# Patient Record
Sex: FEMALE | Race: OTHER | Employment: OTHER | URBAN - METROPOLITAN AREA
[De-identification: names, ages, dates, MRNs, and addresses within clinical notes are randomized per-mention and may not be internally consistent; named-entity substitution may affect disease eponyms.]

---

## 2017-03-29 ENCOUNTER — HOSPITAL ENCOUNTER (OUTPATIENT)
Dept: PREADMISSION TESTING | Age: 67
Discharge: HOME | End: 2017-03-29
Payer: SUBSIDIZED

## 2017-03-29 VITALS
WEIGHT: 206 LBS | SYSTOLIC BLOOD PRESSURE: 124 MMHG | HEART RATE: 74 BPM | HEIGHT: 64 IN | BODY MASS INDEX: 35.17 KG/M2 | DIASTOLIC BLOOD PRESSURE: 76 MMHG | TEMPERATURE: 97.4 F

## 2017-03-29 LAB
ABO + RH BLD: NORMAL
ANION GAP BLD CALC-SCNC: 9 MMOL/L (ref 5–15)
APPEARANCE UR: CLEAR
BACTERIA URNS QL MICRO: NEGATIVE /HPF
BILIRUB UR QL: NEGATIVE
BLOOD GROUP ANTIBODIES SERPL: NORMAL
BUN SERPL-MCNC: 15 MG/DL (ref 6–20)
BUN/CREAT SERPL: 14 (ref 12–20)
CALCIUM SERPL-MCNC: 9.4 MG/DL (ref 8.5–10.1)
CHLORIDE SERPL-SCNC: 104 MMOL/L (ref 97–108)
CO2 SERPL-SCNC: 29 MMOL/L (ref 21–32)
COLOR UR: NORMAL
CREAT SERPL-MCNC: 1.08 MG/DL (ref 0.55–1.02)
EPITH CASTS URNS QL MICRO: NORMAL /LPF
ERYTHROCYTE [DISTWIDTH] IN BLOOD BY AUTOMATED COUNT: 15.6 % (ref 11.5–14.5)
EST. AVERAGE GLUCOSE BLD GHB EST-MCNC: 169 MG/DL
GLUCOSE SERPL-MCNC: 126 MG/DL (ref 65–100)
GLUCOSE UR STRIP.AUTO-MCNC: NEGATIVE MG/DL
HBA1C MFR BLD: 7.5 % (ref 4.2–6.3)
HCT VFR BLD AUTO: 32.4 % (ref 35–47)
HGB BLD-MCNC: 10.3 G/DL (ref 11.5–16)
HGB UR QL STRIP: NEGATIVE
HYALINE CASTS URNS QL MICRO: NORMAL /LPF (ref 0–5)
INR PPP: 0.9 (ref 0.9–1.1)
KETONES UR QL STRIP.AUTO: NEGATIVE MG/DL
LEUKOCYTE ESTERASE UR QL STRIP.AUTO: NEGATIVE
MCH RBC QN AUTO: 26.8 PG (ref 26–34)
MCHC RBC AUTO-ENTMCNC: 31.8 G/DL (ref 30–36.5)
MCV RBC AUTO: 84.2 FL (ref 80–99)
NITRITE UR QL STRIP.AUTO: NEGATIVE
PH UR STRIP: 6 [PH] (ref 5–8)
PLATELET # BLD AUTO: 334 K/UL (ref 150–400)
POTASSIUM SERPL-SCNC: 4.5 MMOL/L (ref 3.5–5.1)
PROT UR STRIP-MCNC: NEGATIVE MG/DL
PROTHROMBIN TIME: 9.5 SEC (ref 9–11.1)
RBC # BLD AUTO: 3.85 M/UL (ref 3.8–5.2)
RBC #/AREA URNS HPF: NORMAL /HPF (ref 0–5)
SODIUM SERPL-SCNC: 142 MMOL/L (ref 136–145)
SP GR UR REFRACTOMETRY: 1.02 (ref 1–1.03)
SPECIMEN EXP DATE BLD: NORMAL
UA: UC IF INDICATED,UAUC: NORMAL
UROBILINOGEN UR QL STRIP.AUTO: 0.2 EU/DL (ref 0.2–1)
WBC # BLD AUTO: 6.1 K/UL (ref 3.6–11)
WBC URNS QL MICRO: NORMAL /HPF (ref 0–4)

## 2017-03-29 PROCEDURE — 81001 URINALYSIS AUTO W/SCOPE: CPT | Performed by: ORTHOPAEDIC SURGERY

## 2017-03-29 PROCEDURE — 36415 COLL VENOUS BLD VENIPUNCTURE: CPT | Performed by: ORTHOPAEDIC SURGERY

## 2017-03-29 PROCEDURE — 85610 PROTHROMBIN TIME: CPT | Performed by: ORTHOPAEDIC SURGERY

## 2017-03-29 PROCEDURE — 93005 ELECTROCARDIOGRAM TRACING: CPT

## 2017-03-29 PROCEDURE — 85027 COMPLETE CBC AUTOMATED: CPT | Performed by: ORTHOPAEDIC SURGERY

## 2017-03-29 PROCEDURE — 83036 HEMOGLOBIN GLYCOSYLATED A1C: CPT | Performed by: ORTHOPAEDIC SURGERY

## 2017-03-29 PROCEDURE — 80048 BASIC METABOLIC PNL TOTAL CA: CPT | Performed by: ORTHOPAEDIC SURGERY

## 2017-03-29 PROCEDURE — 86900 BLOOD TYPING SEROLOGIC ABO: CPT | Performed by: ORTHOPAEDIC SURGERY

## 2017-03-29 RX ORDER — PREGABALIN 75 MG/1
75 CAPSULE ORAL ONCE
Status: CANCELLED | OUTPATIENT
Start: 2017-04-03 | End: 2017-04-03

## 2017-03-29 RX ORDER — DEXAMETHASONE SODIUM PHOSPHATE 100 MG/10ML
10 INJECTION INTRAMUSCULAR; INTRAVENOUS ONCE
Status: CANCELLED | OUTPATIENT
Start: 2017-04-03 | End: 2017-04-03

## 2017-03-29 RX ORDER — CEFAZOLIN SODIUM IN 0.9 % NACL 2 G/50 ML
2 INTRAVENOUS SOLUTION, PIGGYBACK (ML) INTRAVENOUS ONCE
Status: CANCELLED | OUTPATIENT
Start: 2017-04-03 | End: 2017-04-03

## 2017-03-29 RX ORDER — CELECOXIB 200 MG/1
200 CAPSULE ORAL ONCE
Status: CANCELLED | OUTPATIENT
Start: 2017-04-03 | End: 2017-04-03

## 2017-03-29 RX ORDER — LOVASTATIN 40 MG/1
40 TABLET ORAL DAILY
COMMUNITY

## 2017-03-29 RX ORDER — ACETAMINOPHEN 500 MG
1000 TABLET ORAL ONCE
Status: CANCELLED | OUTPATIENT
Start: 2017-04-03 | End: 2017-04-03

## 2017-03-29 NOTE — PERIOP NOTES
PATIENT GIVEN SURGICAL SITE INFECTION FAQ HANDOUT AND HAND WASHING TIP SHEET. PREOP INSTRUCTIONS REVIEWED AND PATIENT VERBALIZES UNDERSTANDING OF INSTRUCTIONS. PATIENT HAS BEEN GIVEN THE OPPORTUNITY TO ASK ADDITIONAL QUESTIONS. PREOPERATIVE INSTRUCTIONS REVIEWED WITH PATIENT. PATIENT GIVEN SIX PACK OF CHG WIPES. INSTRUCTIONS (REVIEWED IN CLASS) ON USE OF CHG WIPES. PATIENT GIVEN SSI INFECTION FAQ SHEET, INFORMATION SHEET ON DIABETIC TREATMENT CENTER AS WELL AS HAND WASHING TIPS SHEETS. MRSA/MSSA TREATMENT INSTRUCTION SHEET GIVEN WITH EXPLANATION TO PATIENT THAT THEY WILL BE NOTIFIED IF TREATMENT INSTRUCTIONS NEED TO BE INITIATED. PATIENT WAS GIVEN THE OPPORTUNITY TO ASK QUESTIONS ON THE INFORMATION PROVIDED.

## 2017-03-30 LAB
ATRIAL RATE: 76 BPM
BACTERIA SPEC CULT: NORMAL
BACTERIA SPEC CULT: NORMAL
CALCULATED P AXIS, ECG09: 60 DEGREES
CALCULATED R AXIS, ECG10: -50 DEGREES
CALCULATED T AXIS, ECG11: 2 DEGREES
DIAGNOSIS, 93000: NORMAL
P-R INTERVAL, ECG05: 152 MS
Q-T INTERVAL, ECG07: 376 MS
QRS DURATION, ECG06: 90 MS
QTC CALCULATION (BEZET), ECG08: 423 MS
SERVICE CMNT-IMP: NORMAL
VENTRICULAR RATE, ECG03: 76 BPM

## 2017-03-30 NOTE — PERIOP NOTES
Faxed preadmission testing reports (and fax confirmation received) to Dr. Kath Quinonez. Called on 3-31-17 at 1110 ( left message on Molly's voicemail) RE: abnormal CBC, HGB A1C .  Consult placed in connect to [facility] and voicemail left

## 2017-03-31 ENCOUNTER — ANESTHESIA EVENT (OUTPATIENT)
Dept: SURGERY | Age: 67
DRG: 470 | End: 2017-03-31
Payer: SUBSIDIZED

## 2017-03-31 NOTE — ANESTHESIA PREPROCEDURE EVALUATION
Anesthetic History   No history of anesthetic complications            Review of Systems / Medical History  Patient summary reviewed, nursing notes reviewed and pertinent labs reviewed    Pulmonary  Within defined limits                 Neuro/Psych       CVA       Cardiovascular    Hypertension                   GI/Hepatic/Renal  Within defined limits              Endo/Other    Diabetes    Obesity and arthritis     Other Findings            Physical Exam    Airway  Mallampati: II  TM Distance: > 6 cm  Neck ROM: normal range of motion   Mouth opening: Normal     Cardiovascular  Regular rate and rhythm,  S1 and S2 normal,  no murmur, click, rub, or gallop             Dental  No notable dental hx       Pulmonary  Breath sounds clear to auscultation               Abdominal  GI exam deferred       Other Findings            Anesthetic Plan    ASA: 3  Anesthesia type: spinal            Anesthetic plan and risks discussed with: Patient

## 2017-04-03 ENCOUNTER — HOSPITAL ENCOUNTER (INPATIENT)
Age: 67
LOS: 4 days | Discharge: HOME HEALTH | DRG: 470 | End: 2017-04-07
Attending: ORTHOPAEDIC SURGERY | Admitting: ORTHOPAEDIC SURGERY
Payer: SUBSIDIZED

## 2017-04-03 ENCOUNTER — APPOINTMENT (OUTPATIENT)
Dept: GENERAL RADIOLOGY | Age: 67
DRG: 470 | End: 2017-04-03
Attending: ORTHOPAEDIC SURGERY
Payer: SUBSIDIZED

## 2017-04-03 ENCOUNTER — ANESTHESIA (OUTPATIENT)
Dept: SURGERY | Age: 67
DRG: 470 | End: 2017-04-03
Payer: SUBSIDIZED

## 2017-04-03 PROBLEM — M17.11 OSTEOARTHRITIS OF RIGHT KNEE: Status: ACTIVE | Noted: 2017-04-03

## 2017-04-03 LAB
GLUCOSE BLD STRIP.AUTO-MCNC: 102 MG/DL (ref 65–100)
GLUCOSE BLD STRIP.AUTO-MCNC: 154 MG/DL (ref 65–100)
GLUCOSE BLD STRIP.AUTO-MCNC: 163 MG/DL (ref 65–100)
GLUCOSE BLD STRIP.AUTO-MCNC: 224 MG/DL (ref 65–100)
SERVICE CMNT-IMP: ABNORMAL

## 2017-04-03 PROCEDURE — 74011000250 HC RX REV CODE- 250

## 2017-04-03 PROCEDURE — 77030020788: Performed by: ORTHOPAEDIC SURGERY

## 2017-04-03 PROCEDURE — 74011250636 HC RX REV CODE- 250/636

## 2017-04-03 PROCEDURE — 77030018779 HC MIX CEM PRSM J&J -B: Performed by: ORTHOPAEDIC SURGERY

## 2017-04-03 PROCEDURE — C1713 ANCHOR/SCREW BN/BN,TIS/BN: HCPCS | Performed by: ORTHOPAEDIC SURGERY

## 2017-04-03 PROCEDURE — 77030006835 HC BLD SAW SAG STRY -B: Performed by: ORTHOPAEDIC SURGERY

## 2017-04-03 PROCEDURE — 97530 THERAPEUTIC ACTIVITIES: CPT

## 2017-04-03 PROCEDURE — 74011000258 HC RX REV CODE- 258: Performed by: ORTHOPAEDIC SURGERY

## 2017-04-03 PROCEDURE — C9290 INJ, BUPIVACAINE LIPOSOME: HCPCS | Performed by: ORTHOPAEDIC SURGERY

## 2017-04-03 PROCEDURE — 74011250636 HC RX REV CODE- 250/636: Performed by: ANESTHESIOLOGY

## 2017-04-03 PROCEDURE — 77030012935 HC DRSG AQUACEL BMS -B: Performed by: ORTHOPAEDIC SURGERY

## 2017-04-03 PROCEDURE — 76210000000 HC OR PH I REC 2 TO 2.5 HR: Performed by: ORTHOPAEDIC SURGERY

## 2017-04-03 PROCEDURE — 77030033067 HC SUT PDO STRATFX SPIR J&J -B: Performed by: ORTHOPAEDIC SURGERY

## 2017-04-03 PROCEDURE — 97161 PT EVAL LOW COMPLEX 20 MIN: CPT

## 2017-04-03 PROCEDURE — 77030010507 HC ADH SKN DERMBND J&J -B: Performed by: ORTHOPAEDIC SURGERY

## 2017-04-03 PROCEDURE — 74011250636 HC RX REV CODE- 250/636: Performed by: ORTHOPAEDIC SURGERY

## 2017-04-03 PROCEDURE — 77030018836 HC SOL IRR NACL ICUM -A: Performed by: ORTHOPAEDIC SURGERY

## 2017-04-03 PROCEDURE — 77030013079 HC BLNKT BAIR HGGR 3M -A: Performed by: ANESTHESIOLOGY

## 2017-04-03 PROCEDURE — 77030011640 HC PAD GRND REM COVD -A: Performed by: ORTHOPAEDIC SURGERY

## 2017-04-03 PROCEDURE — 77030014077 HC TOWER MX CEM J&J -C: Performed by: ORTHOPAEDIC SURGERY

## 2017-04-03 PROCEDURE — 82962 GLUCOSE BLOOD TEST: CPT

## 2017-04-03 PROCEDURE — 77030018846 HC SOL IRR STRL H20 ICUM -A: Performed by: ORTHOPAEDIC SURGERY

## 2017-04-03 PROCEDURE — 73560 X-RAY EXAM OF KNEE 1 OR 2: CPT

## 2017-04-03 PROCEDURE — 65270000029 HC RM PRIVATE

## 2017-04-03 PROCEDURE — 77030006822 HC BLD SAW SAG BRSM -B: Performed by: ORTHOPAEDIC SURGERY

## 2017-04-03 PROCEDURE — 77030020061 HC IV BLD WRMR ADMIN SET 3M -B: Performed by: ANESTHESIOLOGY

## 2017-04-03 PROCEDURE — 74011250637 HC RX REV CODE- 250/637: Performed by: ORTHOPAEDIC SURGERY

## 2017-04-03 PROCEDURE — 76060000038 HC ANESTHESIA 3.5 TO 4 HR: Performed by: ORTHOPAEDIC SURGERY

## 2017-04-03 PROCEDURE — 74011000250 HC RX REV CODE- 250: Performed by: ORTHOPAEDIC SURGERY

## 2017-04-03 PROCEDURE — 77030034850: Performed by: ORTHOPAEDIC SURGERY

## 2017-04-03 PROCEDURE — 76010000174 HC OR TIME 3.5 TO 4 HR INTENSV-TIER 1: Performed by: ORTHOPAEDIC SURGERY

## 2017-04-03 PROCEDURE — C1776 JOINT DEVICE (IMPLANTABLE): HCPCS | Performed by: ORTHOPAEDIC SURGERY

## 2017-04-03 PROCEDURE — 77030028224 HC PDNG CST BSNM -A: Performed by: ORTHOPAEDIC SURGERY

## 2017-04-03 PROCEDURE — 77030000032 HC CUF TRNQT ZIMM -B: Performed by: ORTHOPAEDIC SURGERY

## 2017-04-03 PROCEDURE — 77030031139 HC SUT VCRL2 J&J -A: Performed by: ORTHOPAEDIC SURGERY

## 2017-04-03 PROCEDURE — 77030018842 HC SOL IRR SOD CL 9% BAXT -A: Performed by: ORTHOPAEDIC SURGERY

## 2017-04-03 PROCEDURE — 0SRC0J9 REPLACEMENT OF RIGHT KNEE JOINT WITH SYNTHETIC SUBSTITUTE, CEMENTED, OPEN APPROACH: ICD-10-PCS | Performed by: ORTHOPAEDIC SURGERY

## 2017-04-03 PROCEDURE — 97116 GAIT TRAINING THERAPY: CPT

## 2017-04-03 PROCEDURE — 74011000250 HC RX REV CODE- 250: Performed by: ANESTHESIOLOGY

## 2017-04-03 PROCEDURE — 77030002933 HC SUT MCRYL J&J -A: Performed by: ORTHOPAEDIC SURGERY

## 2017-04-03 DEVICE — CEMENT BNE GENTAMICIN 40 GM SMARTSET GMV: Type: IMPLANTABLE DEVICE | Site: KNEE | Status: FUNCTIONAL

## 2017-04-03 DEVICE — ADAPTER FEM 5DEG KNEE PFC SIG: Type: IMPLANTABLE DEVICE | Site: KNEE | Status: FUNCTIONAL

## 2017-04-03 DEVICE — TRAY TIB SZ 2.5 AP44.2MM ML67.1MM THK4.8MM KNEE CO CHROM: Type: IMPLANTABLE DEVICE | Site: KNEE | Status: FUNCTIONAL

## 2017-04-03 DEVICE — COMPONENT PAT DIA35MM DST KNEE POLY OVL DOME 3 PEG NP CEM: Type: IMPLANTABLE DEVICE | Site: KNEE | Status: FUNCTIONAL

## 2017-04-03 DEVICE — ADAPTER FEM NEUT KNEE BOLT PFC SIG: Type: IMPLANTABLE DEVICE | Site: KNEE | Status: FUNCTIONAL

## 2017-04-03 DEVICE — STEM FEM L30MM DIA13MM UNIV KNEE CEM FOR ROT HNG SYS S-ROM: Type: IMPLANTABLE DEVICE | Site: KNEE | Status: FUNCTIONAL

## 2017-04-03 RX ORDER — TRANEXAMIC ACID 100 MG/ML
INJECTION, SOLUTION INTRAVENOUS AS NEEDED
Status: DISCONTINUED | OUTPATIENT
Start: 2017-04-03 | End: 2017-04-03 | Stop reason: HOSPADM

## 2017-04-03 RX ORDER — SODIUM CHLORIDE, SODIUM LACTATE, POTASSIUM CHLORIDE, CALCIUM CHLORIDE 600; 310; 30; 20 MG/100ML; MG/100ML; MG/100ML; MG/100ML
1000 INJECTION, SOLUTION INTRAVENOUS CONTINUOUS
Status: DISCONTINUED | OUTPATIENT
Start: 2017-04-03 | End: 2017-04-03 | Stop reason: HOSPADM

## 2017-04-03 RX ORDER — OXYCODONE HYDROCHLORIDE 5 MG/1
5 TABLET ORAL
Status: DISCONTINUED | OUTPATIENT
Start: 2017-04-03 | End: 2017-04-07 | Stop reason: HOSPADM

## 2017-04-03 RX ORDER — SODIUM CHLORIDE, SODIUM LACTATE, POTASSIUM CHLORIDE, CALCIUM CHLORIDE 600; 310; 30; 20 MG/100ML; MG/100ML; MG/100ML; MG/100ML
100 INJECTION, SOLUTION INTRAVENOUS CONTINUOUS
Status: DISCONTINUED | OUTPATIENT
Start: 2017-04-03 | End: 2017-04-03 | Stop reason: HOSPADM

## 2017-04-03 RX ORDER — MIDAZOLAM HYDROCHLORIDE 1 MG/ML
1 INJECTION, SOLUTION INTRAMUSCULAR; INTRAVENOUS AS NEEDED
Status: DISCONTINUED | OUTPATIENT
Start: 2017-04-03 | End: 2017-04-03 | Stop reason: HOSPADM

## 2017-04-03 RX ORDER — POLYETHYLENE GLYCOL 3350 17 G/17G
17 POWDER, FOR SOLUTION ORAL DAILY
Status: DISCONTINUED | OUTPATIENT
Start: 2017-04-04 | End: 2017-04-07 | Stop reason: HOSPADM

## 2017-04-03 RX ORDER — SODIUM CHLORIDE 0.9 % (FLUSH) 0.9 %
5-10 SYRINGE (ML) INJECTION EVERY 8 HOURS
Status: DISCONTINUED | OUTPATIENT
Start: 2017-04-03 | End: 2017-04-03 | Stop reason: HOSPADM

## 2017-04-03 RX ORDER — ACETAMINOPHEN 325 MG/1
650 TABLET ORAL EVERY 6 HOURS
Status: DISCONTINUED | OUTPATIENT
Start: 2017-04-03 | End: 2017-04-07 | Stop reason: HOSPADM

## 2017-04-03 RX ORDER — SODIUM CHLORIDE 0.9 % (FLUSH) 0.9 %
5-10 SYRINGE (ML) INJECTION AS NEEDED
Status: DISCONTINUED | OUTPATIENT
Start: 2017-04-03 | End: 2017-04-03 | Stop reason: HOSPADM

## 2017-04-03 RX ORDER — PROPOFOL 10 MG/ML
INJECTION, EMULSION INTRAVENOUS
Status: DISCONTINUED | OUTPATIENT
Start: 2017-04-03 | End: 2017-04-03 | Stop reason: HOSPADM

## 2017-04-03 RX ORDER — CEFAZOLIN SODIUM IN 0.9 % NACL 2 G/50 ML
2 INTRAVENOUS SOLUTION, PIGGYBACK (ML) INTRAVENOUS EVERY 8 HOURS
Status: COMPLETED | OUTPATIENT
Start: 2017-04-03 | End: 2017-04-04

## 2017-04-03 RX ORDER — HYDROXYZINE HYDROCHLORIDE 10 MG/1
10 TABLET, FILM COATED ORAL
Status: DISCONTINUED | OUTPATIENT
Start: 2017-04-03 | End: 2017-04-07 | Stop reason: HOSPADM

## 2017-04-03 RX ORDER — OXYCODONE HYDROCHLORIDE 5 MG/1
5 TABLET ORAL AS NEEDED
Status: DISCONTINUED | OUTPATIENT
Start: 2017-04-03 | End: 2017-04-03 | Stop reason: HOSPADM

## 2017-04-03 RX ORDER — CELECOXIB 200 MG/1
200 CAPSULE ORAL ONCE
Status: COMPLETED | OUTPATIENT
Start: 2017-04-03 | End: 2017-04-03

## 2017-04-03 RX ORDER — MIDAZOLAM HYDROCHLORIDE 1 MG/ML
0.5 INJECTION, SOLUTION INTRAMUSCULAR; INTRAVENOUS
Status: DISCONTINUED | OUTPATIENT
Start: 2017-04-03 | End: 2017-04-03 | Stop reason: HOSPADM

## 2017-04-03 RX ORDER — ASPIRIN 325 MG
325 TABLET, DELAYED RELEASE (ENTERIC COATED) ORAL 2 TIMES DAILY
Status: DISCONTINUED | OUTPATIENT
Start: 2017-04-03 | End: 2017-04-06

## 2017-04-03 RX ORDER — FENTANYL CITRATE 50 UG/ML
25 INJECTION, SOLUTION INTRAMUSCULAR; INTRAVENOUS
Status: DISCONTINUED | OUTPATIENT
Start: 2017-04-03 | End: 2017-04-03 | Stop reason: HOSPADM

## 2017-04-03 RX ORDER — MORPHINE SULFATE 10 MG/ML
2 INJECTION, SOLUTION INTRAMUSCULAR; INTRAVENOUS
Status: DISCONTINUED | OUTPATIENT
Start: 2017-04-03 | End: 2017-04-03 | Stop reason: HOSPADM

## 2017-04-03 RX ORDER — SODIUM CHLORIDE 9 MG/ML
25 INJECTION, SOLUTION INTRAVENOUS CONTINUOUS
Status: DISCONTINUED | OUTPATIENT
Start: 2017-04-03 | End: 2017-04-03 | Stop reason: HOSPADM

## 2017-04-03 RX ORDER — SODIUM CHLORIDE 9 MG/ML
125 INJECTION, SOLUTION INTRAVENOUS CONTINUOUS
Status: DISPENSED | OUTPATIENT
Start: 2017-04-03 | End: 2017-04-04

## 2017-04-03 RX ORDER — FAMOTIDINE 20 MG/1
20 TABLET, FILM COATED ORAL 2 TIMES DAILY
Status: DISCONTINUED | OUTPATIENT
Start: 2017-04-03 | End: 2017-04-07 | Stop reason: HOSPADM

## 2017-04-03 RX ORDER — HYDROMORPHONE HYDROCHLORIDE 1 MG/ML
0.2 INJECTION, SOLUTION INTRAMUSCULAR; INTRAVENOUS; SUBCUTANEOUS
Status: ACTIVE | OUTPATIENT
Start: 2017-04-03 | End: 2017-04-03

## 2017-04-03 RX ORDER — HYDROMORPHONE HYDROCHLORIDE 1 MG/ML
0.5 INJECTION, SOLUTION INTRAMUSCULAR; INTRAVENOUS; SUBCUTANEOUS
Status: ACTIVE | OUTPATIENT
Start: 2017-04-03 | End: 2017-04-04

## 2017-04-03 RX ORDER — SODIUM CHLORIDE 0.9 % (FLUSH) 0.9 %
5-10 SYRINGE (ML) INJECTION EVERY 8 HOURS
Status: DISCONTINUED | OUTPATIENT
Start: 2017-04-04 | End: 2017-04-07 | Stop reason: HOSPADM

## 2017-04-03 RX ORDER — BUPIVACAINE HYDROCHLORIDE 5 MG/ML
INJECTION, SOLUTION EPIDURAL; INTRACAUDAL AS NEEDED
Status: DISCONTINUED | OUTPATIENT
Start: 2017-04-03 | End: 2017-04-03 | Stop reason: HOSPADM

## 2017-04-03 RX ORDER — ONDANSETRON 2 MG/ML
4 INJECTION INTRAMUSCULAR; INTRAVENOUS AS NEEDED
Status: DISCONTINUED | OUTPATIENT
Start: 2017-04-03 | End: 2017-04-03 | Stop reason: HOSPADM

## 2017-04-03 RX ORDER — KETOROLAC TROMETHAMINE 30 MG/ML
15 INJECTION, SOLUTION INTRAMUSCULAR; INTRAVENOUS EVERY 6 HOURS
Status: COMPLETED | OUTPATIENT
Start: 2017-04-03 | End: 2017-04-04

## 2017-04-03 RX ORDER — SODIUM CHLORIDE, SODIUM LACTATE, POTASSIUM CHLORIDE, CALCIUM CHLORIDE 600; 310; 30; 20 MG/100ML; MG/100ML; MG/100ML; MG/100ML
INJECTION, SOLUTION INTRAVENOUS
Status: DISCONTINUED | OUTPATIENT
Start: 2017-04-03 | End: 2017-04-03 | Stop reason: HOSPADM

## 2017-04-03 RX ORDER — LOVASTATIN 20 MG/1
40 TABLET ORAL
Status: DISCONTINUED | OUTPATIENT
Start: 2017-04-04 | End: 2017-04-07 | Stop reason: HOSPADM

## 2017-04-03 RX ORDER — ONDANSETRON 2 MG/ML
4 INJECTION INTRAMUSCULAR; INTRAVENOUS
Status: ACTIVE | OUTPATIENT
Start: 2017-04-03 | End: 2017-04-04

## 2017-04-03 RX ORDER — LISINOPRIL 20 MG/1
20 TABLET ORAL DAILY
Status: DISCONTINUED | OUTPATIENT
Start: 2017-04-03 | End: 2017-04-07 | Stop reason: HOSPADM

## 2017-04-03 RX ORDER — LIDOCAINE HYDROCHLORIDE 10 MG/ML
0.1 INJECTION, SOLUTION EPIDURAL; INFILTRATION; INTRACAUDAL; PERINEURAL AS NEEDED
Status: DISCONTINUED | OUTPATIENT
Start: 2017-04-03 | End: 2017-04-03 | Stop reason: HOSPADM

## 2017-04-03 RX ORDER — ACETAMINOPHEN 500 MG
1000 TABLET ORAL ONCE
Status: COMPLETED | OUTPATIENT
Start: 2017-04-03 | End: 2017-04-03

## 2017-04-03 RX ORDER — HYDROCHLOROTHIAZIDE 25 MG/1
25 TABLET ORAL DAILY
Status: DISCONTINUED | OUTPATIENT
Start: 2017-04-03 | End: 2017-04-07 | Stop reason: HOSPADM

## 2017-04-03 RX ORDER — MIDAZOLAM HYDROCHLORIDE 1 MG/ML
INJECTION, SOLUTION INTRAMUSCULAR; INTRAVENOUS AS NEEDED
Status: DISCONTINUED | OUTPATIENT
Start: 2017-04-03 | End: 2017-04-03 | Stop reason: HOSPADM

## 2017-04-03 RX ORDER — FACIAL-BODY WIPES
10 EACH TOPICAL DAILY PRN
Status: DISCONTINUED | OUTPATIENT
Start: 2017-04-05 | End: 2017-04-07 | Stop reason: HOSPADM

## 2017-04-03 RX ORDER — CELECOXIB 200 MG/1
200 CAPSULE ORAL 2 TIMES DAILY
Status: DISCONTINUED | OUTPATIENT
Start: 2017-04-04 | End: 2017-04-07 | Stop reason: HOSPADM

## 2017-04-03 RX ORDER — AMOXICILLIN 250 MG
1 CAPSULE ORAL 2 TIMES DAILY
Status: DISCONTINUED | OUTPATIENT
Start: 2017-04-03 | End: 2017-04-07 | Stop reason: HOSPADM

## 2017-04-03 RX ORDER — SODIUM CHLORIDE 0.9 % (FLUSH) 0.9 %
5-10 SYRINGE (ML) INJECTION AS NEEDED
Status: DISCONTINUED | OUTPATIENT
Start: 2017-04-03 | End: 2017-04-07 | Stop reason: HOSPADM

## 2017-04-03 RX ORDER — NALOXONE HYDROCHLORIDE 0.4 MG/ML
0.4 INJECTION, SOLUTION INTRAMUSCULAR; INTRAVENOUS; SUBCUTANEOUS AS NEEDED
Status: DISCONTINUED | OUTPATIENT
Start: 2017-04-03 | End: 2017-04-07 | Stop reason: HOSPADM

## 2017-04-03 RX ORDER — ROPIVACAINE HYDROCHLORIDE 5 MG/ML
150 INJECTION, SOLUTION EPIDURAL; INFILTRATION; PERINEURAL AS NEEDED
Status: DISCONTINUED | OUTPATIENT
Start: 2017-04-03 | End: 2017-04-03 | Stop reason: HOSPADM

## 2017-04-03 RX ORDER — LABETALOL HYDROCHLORIDE 5 MG/ML
5 INJECTION, SOLUTION INTRAVENOUS
Status: DISCONTINUED | OUTPATIENT
Start: 2017-04-03 | End: 2017-04-03 | Stop reason: HOSPADM

## 2017-04-03 RX ORDER — CEFAZOLIN SODIUM IN 0.9 % NACL 2 G/50 ML
2 INTRAVENOUS SOLUTION, PIGGYBACK (ML) INTRAVENOUS ONCE
Status: COMPLETED | OUTPATIENT
Start: 2017-04-03 | End: 2017-04-03

## 2017-04-03 RX ORDER — FENTANYL CITRATE 50 UG/ML
50 INJECTION, SOLUTION INTRAMUSCULAR; INTRAVENOUS AS NEEDED
Status: DISCONTINUED | OUTPATIENT
Start: 2017-04-03 | End: 2017-04-03 | Stop reason: HOSPADM

## 2017-04-03 RX ORDER — ONDANSETRON 2 MG/ML
INJECTION INTRAMUSCULAR; INTRAVENOUS AS NEEDED
Status: DISCONTINUED | OUTPATIENT
Start: 2017-04-03 | End: 2017-04-03 | Stop reason: HOSPADM

## 2017-04-03 RX ORDER — DIPHENHYDRAMINE HYDROCHLORIDE 50 MG/ML
12.5 INJECTION, SOLUTION INTRAMUSCULAR; INTRAVENOUS AS NEEDED
Status: DISCONTINUED | OUTPATIENT
Start: 2017-04-03 | End: 2017-04-03 | Stop reason: HOSPADM

## 2017-04-03 RX ADMIN — ASPIRIN 325 MG: 325 TABLET, DELAYED RELEASE ORAL at 19:08

## 2017-04-03 RX ADMIN — BUPIVACAINE HYDROCHLORIDE 12 MG: 5 INJECTION, SOLUTION EPIDURAL; INTRACAUDAL at 07:50

## 2017-04-03 RX ADMIN — TRANEXAMIC ACID 1000 MG: 100 INJECTION, SOLUTION INTRAVENOUS at 08:02

## 2017-04-03 RX ADMIN — LABETALOL HYDROCHLORIDE 5 MG: 5 INJECTION, SOLUTION INTRAVENOUS at 13:01

## 2017-04-03 RX ADMIN — PROPOFOL 50 MCG/KG/MIN: 10 INJECTION, EMULSION INTRAVENOUS at 07:49

## 2017-04-03 RX ADMIN — SODIUM CHLORIDE, SODIUM LACTATE, POTASSIUM CHLORIDE, AND CALCIUM CHLORIDE 1000 ML: 600; 310; 30; 20 INJECTION, SOLUTION INTRAVENOUS at 06:57

## 2017-04-03 RX ADMIN — MIDAZOLAM HYDROCHLORIDE 1 MG: 1 INJECTION, SOLUTION INTRAMUSCULAR; INTRAVENOUS at 07:44

## 2017-04-03 RX ADMIN — CEFAZOLIN 2 G: 1 INJECTION, POWDER, FOR SOLUTION INTRAMUSCULAR; INTRAVENOUS; PARENTERAL at 07:57

## 2017-04-03 RX ADMIN — KETOROLAC TROMETHAMINE 15 MG: 30 INJECTION, SOLUTION INTRAMUSCULAR at 19:10

## 2017-04-03 RX ADMIN — ACETAMINOPHEN 1000 MG: 500 TABLET, FILM COATED ORAL at 07:34

## 2017-04-03 RX ADMIN — MIDAZOLAM HYDROCHLORIDE 3 MG: 1 INJECTION, SOLUTION INTRAMUSCULAR; INTRAVENOUS at 07:40

## 2017-04-03 RX ADMIN — SODIUM CHLORIDE, SODIUM LACTATE, POTASSIUM CHLORIDE, CALCIUM CHLORIDE: 600; 310; 30; 20 INJECTION, SOLUTION INTRAVENOUS at 09:41

## 2017-04-03 RX ADMIN — OXYCODONE HYDROCHLORIDE 5 MG: 5 TABLET ORAL at 15:11

## 2017-04-03 RX ADMIN — OXYCODONE HYDROCHLORIDE 5 MG: 5 TABLET ORAL at 19:17

## 2017-04-03 RX ADMIN — LABETALOL HYDROCHLORIDE 5 MG: 5 INJECTION, SOLUTION INTRAVENOUS at 12:31

## 2017-04-03 RX ADMIN — ACETAMINOPHEN 650 MG: 325 TABLET ORAL at 19:08

## 2017-04-03 RX ADMIN — LISINOPRIL 20 MG: 20 TABLET ORAL at 15:11

## 2017-04-03 RX ADMIN — HYDROCHLOROTHIAZIDE 25 MG: 25 TABLET ORAL at 15:11

## 2017-04-03 RX ADMIN — ONDANSETRON 4 MG: 2 INJECTION INTRAMUSCULAR; INTRAVENOUS at 11:20

## 2017-04-03 RX ADMIN — SODIUM CHLORIDE, SODIUM LACTATE, POTASSIUM CHLORIDE, CALCIUM CHLORIDE: 600; 310; 30; 20 INJECTION, SOLUTION INTRAVENOUS at 07:42

## 2017-04-03 RX ADMIN — CEFAZOLIN 2 G: 1 INJECTION, POWDER, FOR SOLUTION INTRAMUSCULAR; INTRAVENOUS; PARENTERAL at 15:11

## 2017-04-03 RX ADMIN — DOCUSATE SODIUM AND SENNOSIDES 1 TABLET: 8.6; 5 TABLET, FILM COATED ORAL at 19:09

## 2017-04-03 RX ADMIN — SODIUM CHLORIDE, SODIUM LACTATE, POTASSIUM CHLORIDE, CALCIUM CHLORIDE: 600; 310; 30; 20 INJECTION, SOLUTION INTRAVENOUS at 08:40

## 2017-04-03 RX ADMIN — LABETALOL HYDROCHLORIDE 5 MG: 5 INJECTION, SOLUTION INTRAVENOUS at 12:47

## 2017-04-03 RX ADMIN — CELECOXIB 200 MG: 200 CAPSULE ORAL at 07:34

## 2017-04-03 RX ADMIN — MIDAZOLAM HYDROCHLORIDE 1 MG: 1 INJECTION, SOLUTION INTRAMUSCULAR; INTRAVENOUS at 07:47

## 2017-04-03 RX ADMIN — FAMOTIDINE 20 MG: 20 TABLET, FILM COATED ORAL at 19:09

## 2017-04-03 NOTE — PERIOP NOTES
Patient's DBP >90. Spoke to Dr. Desean Yarbrough. Labetalol 5mg IV PRN given to treat BP. Continue to monitor.

## 2017-04-03 NOTE — PROGRESS NOTES
Occupational Therapy   Orders received, chart review completed. Note patient POD #0 from RIGHT TOTAL KNEE ARTHROPLASTY. Per pathway, OT will follow up tomorrow for evaluation. Recommend OOB to chair three times a day for meals and self-completion of ADLs as able and medically stable. Thank you.

## 2017-04-03 NOTE — ANESTHESIA PROCEDURE NOTES
Spinal Block    Start time: 4/3/2017 7:46 AM  End time: 4/3/2017 7:50 AM  Performed by: Jim Fermin  Authorized by: Jim Fermin     Pre-procedure:   Indications: primary anesthetic  Preanesthetic Checklist: patient identified, risks and benefits discussed, anesthesia consent, site marked, patient being monitored and timeout performed    Timeout Time: 07:46          Spinal Block:   Patient Position:  Seated  Prep Region:  Lumbar  Prep: Betadine and patient draped      Location:  L3-4  Technique:  Single shot  Local:  Lidocaine 1%      Needle:   Needle Type:  Pencil-tip  Needle Gauge:  25 G  Attempts:  1      Events: CSF confirmed, no blood with aspiration and no paresthesia        Assessment:  Insertion:  Uncomplicated  Patient tolerance:  Patient tolerated the procedure well with no immediate complications

## 2017-04-03 NOTE — IP AVS SNAPSHOT
Current Discharge Medication List  
  
START taking these medications Dose & Instructions Dispensing Information Comments Morning Noon Evening Bedtime  
 diclofenac EC 75 mg EC tablet Commonly known as:  VOLTAREN Your last dose was: Your next dose is:    
   
   
 Dose:  75 mg Take 1 Tab by mouth two (2) times a day. Quantity:  60 Tab Refills:  0  
     
   
   
   
  
 enoxaparin 80 mg/0.8 mL injection Commonly known as:  LOVENOX Your last dose was: Your next dose is:    
   
   
 Dose:  80 mg  
80 mg by SubCUTAneous route every twelve (12) hours every twelve (12) hours for 5 days. Indications: DEEP VENOUS THROMBOSIS Quantity:  8 mL Refills:  0  
     
   
   
   
  
 oxyCODONE IR 5 mg immediate release tablet Commonly known as:  Brett Segovia Your last dose was: Your next dose is:    
   
   
 Dose:  5 mg Take 1 Tab by mouth every three (3) hours as needed. Max Daily Amount: 40 mg.  
 Quantity:  60 Tab Refills:  0  
     
   
   
   
  
 traMADol 50 mg tablet Commonly known as:  ULTRAM  
   
Your last dose was: Your next dose is:    
   
   
 Dose:  50 mg Take 1 Tab by mouth every six (6) hours as needed for Pain. Max Daily Amount: 200 mg. Quantity:  60 Tab Refills:  0  
     
   
   
   
  
 warfarin 5 mg tablet Commonly known as:  COUMADIN Your last dose was: Your next dose is:    
   
   
 Dose:  5 mg Take 1 Tab by mouth daily. Quantity:  90 Tab Refills:  0 CONTINUE these medications which have NOT CHANGED Dose & Instructions Dispensing Information Comments Morning Noon Evening Bedtime JANUMET  mg per tablet Generic drug:  SITagliptin-metFORMIN Your last dose was: Your next dose is:    
   
   
 Dose:  1 Tab Take 1 Tab by mouth two (2) times daily (with meals). Refills:  0 lisinopril-hydroCHLOROthiazide 20-25 mg per tablet Commonly known as:  Max Serum Your last dose was: Your next dose is:    
   
   
 Dose:  1 Tab Take 1 Tab by mouth daily. Refills:  0  
     
   
   
   
  
 lovastatin 40 mg tablet Commonly known as:  MEVACOR Your last dose was: Your next dose is:    
   
   
 Dose:  40 mg Take 40 mg by mouth daily. Refills:  0  
     
   
   
   
  
 naproxen 375 mg tablet Commonly known as:  NAPROSYN Your last dose was: Your next dose is:    
   
   
 Dose:  375 mg Take 375 mg by mouth two (2) times daily (with meals). Refills:  0 Where to Get Your Medications Information on where to get these meds will be given to you by the nurse or doctor. ! Ask your nurse or doctor about these medications  
  diclofenac EC 75 mg EC tablet  
 enoxaparin 80 mg/0.8 mL injection  
 oxyCODONE IR 5 mg immediate release tablet  
 traMADol 50 mg tablet  
 warfarin 5 mg tablet

## 2017-04-03 NOTE — PROGRESS NOTES
Primary Nurse Ebenezer Mora RN and Kendall Keys RN performed a dual skin assessment on this patient No impairment noted  Tim score is 21

## 2017-04-03 NOTE — PROGRESS NOTES
Bedside and Verbal shift change report given to Con Singh [place] (oncoming nurse) by Dmitriy Orellana RN(offgoing nurse). Report included the following information SBAR, Kardex and MAR.

## 2017-04-03 NOTE — IP AVS SNAPSHOT
[address] 
[phone] Patient: Jennifer Palmer MRN: [id number] UPY:1[birth year]82/[birth year] You are allergic to the following No active allergies Recent Documentation Height Weight BMI OB Status Smoking Status 1.626 m 96.2 kg 36.4 kg/m2 Postmenopausal Never Smoker Emergency Contacts Name Discharge Info Relation Home Work Mobile Caryle Misty DISCHARGE CAREGIVER [3] Child [2]   [phone] About your hospitalization You were admitted on:  April 3, 2017 You last received care in the:  [address] [place] [address] You were discharged on:  April 7, 2017 Unit phone number:  [phone] Why you were hospitalized Your primary diagnosis was:  Not on File Your diagnoses also included:  Osteoarthritis Of Right Knee Providers Seen During Your Hospitalizations Provider Role Specialty Primary office phone Gia Soliz MD Attending Provider Orthopedic Surgery [phone] Your Primary Care Physician (PCP) Primary Care Physician Office Phone Office Fax Usman Lafleur [phone] Follow-up Information Follow up With Details Comments Contact Info [address] [place] [address] 
[phone] Shukri Rivas MD   [address] [facility] [address] [facility] 
[phone] Current Discharge Medication List  
  
START taking these medications Dose & Instructions Dispensing Information Comments Morning Noon Evening Bedtime  
 diclofenac EC 75 mg EC tablet Commonly known as:  VOLTAREN Your last dose was: Your next dose is:    
   
   
 Dose:  75 mg Take 1 Tab by mouth two (2) times a day. Quantity:  60 Tab Refills:  0  
     
   
   
   
  
 enoxaparin 80 mg/0.8 mL injection Commonly known as:  LOVENOX Your last dose was: Your next dose is:    
   
   
 Dose:  80 mg  
80 mg by SubCUTAneous route every twelve (12) hours every twelve (12) hours for 5 days. Indications: DEEP VENOUS THROMBOSIS Quantity:  8 mL Refills:  0  
     
   
   
   
  
 oxyCODONE IR 5 mg immediate release tablet Commonly known as:  Tia Flatter Your last dose was: Your next dose is:    
   
   
 Dose:  5 mg Take 1 Tab by mouth every three (3) hours as needed. Max Daily Amount: 40 mg.  
 Quantity:  60 Tab Refills:  0  
     
   
   
   
  
 traMADol 50 mg tablet Commonly known as:  ULTRAM  
   
Your last dose was: Your next dose is:    
   
   
 Dose:  50 mg Take 1 Tab by mouth every six (6) hours as needed for Pain. Max Daily Amount: 200 mg. Quantity:  60 Tab Refills:  0  
     
   
   
   
  
 warfarin 5 mg tablet Commonly known as:  COUMADIN Your last dose was: Your next dose is:    
   
   
 Dose:  5 mg Take 1 Tab by mouth daily. Quantity:  90 Tab Refills:  0 CONTINUE these medications which have NOT CHANGED Dose & Instructions Dispensing Information Comments Morning Noon Evening Bedtime JANUMET  mg per tablet Generic drug:  SITagliptin-metFORMIN Your last dose was: Your next dose is:    
   
   
 Dose:  1 Tab Take 1 Tab by mouth two (2) times daily (with meals). Refills:  0  
     
   
   
   
  
 lisinopril-hydroCHLOROthiazide 20-25 mg per tablet Commonly known as:  Malini Bounds Your last dose was: Your next dose is:    
   
   
 Dose:  1 Tab Take 1 Tab by mouth daily. Refills:  0  
     
   
   
   
  
 lovastatin 40 mg tablet Commonly known as:  MEVACOR Your last dose was: Your next dose is:    
   
   
 Dose:  40 mg Take 40 mg by mouth daily. Refills:  0  
     
   
   
   
  
 naproxen 375 mg tablet Commonly known as:  NAPROSYN Your last dose was: Your next dose is:    
   
   
 Dose:  375 mg Take 375 mg by mouth two (2) times daily (with meals). Refills:  0 Where to Get Your Medications Information on where to get these meds will be given to you by the nurse or doctor. ! Ask your nurse or doctor about these medications  
  diclofenac EC 75 mg EC tablet  
 enoxaparin 80 mg/0.8 mL injection  
 oxyCODONE IR 5 mg immediate release tablet  
 traMADol 50 mg tablet  
 warfarin 5 mg tablet Discharge Instructions After Hospital Care Plan:  Discharge Instructions Knee Replacement Dr. Elier Duffy Patient Name: Adalid Hill Date of procedure: 4/3/2017 Procedure: Procedure(s): RIGHT TOTAL KNEE ARTHROPLASTY Surgeon: Surgeon(s) and Role: Sam Clarke MD - Primary PCP: Christine Vega MD 
Date of discharge: No discharge date for patient encounter. Follow up appointments 
-follow up with Dr. Elier Duffy in 3 weeks. Call [phone] to make an appointment. [facility] Agency: ________________________ phone: _____________________ The agency will contact you to arrange dates/times for visits. Please call them if you do not hear from them within 24 hours after you are discharged When to call your Orthopaedic Surgeon: 
-unrelieved pain 
-Signs of infection-if your incision is red; continues to have drainage; drainage has a foul odor or if you have a persistent fever over 101 degrees When to call your Primary Care Physician: 
-Concerns about medical conditions such as diabetes, high blood pressure, asthma, congestive heart failure 
-Call if blood sugars are elevated, persistent headache or dizziness, coughing or congestion, constipation or diarrhea, burning with urination, abnormal heart rate When to call 911and go to the nearest emergency room 
-acute onset of chest pain, shortness of breath, difficulty breathing Activity -walk with your walker or crutches putting as much weight on your leg as you can tolerate. Refer to pages 23-31 of your handbook for instructions and pictures 
-do 20 repetitions of each exercise 3 times each day as instructed by the physical therapist.  Refer to pages 32-40 of your handbook for exercise instructions and pictures 
-get up every one hour and walk (except at night when sleeping) 
-do not drive or operate heavy machinery Incision Care 
-the Aquacel (brown, waterproof) surgical dressing is to remain on your knee for 7 days. On the 7th day have someone gently peel the dressing off by carefully lifting the edge and stretching it slightly to break the adhesive seal and leave incision open to air. You may take a shower with the Aquacel dressing in place. Preventing blood clots  
-You have a diagnosed blood clot. You will be on treatment for this for the next 3-6 months per the hospitalist recommendation. Pain management - Please take scheduled 650 mg tylenol every 6 hours for 4 weeks - Please take scheduled diclofenac 75 mg twice daily for 4 weeks - Please take tramadol every 6 hours for pain as needed for pain. - For breakthrough please take 5-10 mg oxycodone - Avoid alcoholic beverages while taking pain medication - Do not take any over-the-counter medication for pain except Tylenol (acetaminophen) - Please be aware that many medications contain Tylenol. We do not want you to over medicate so please read the information below as a guide. Do not take more than 4 Grams of Tylenol in a 24 hour - You may place an ice bag on your knee for 15-20 minutes after exercising and as needed throughout the day and night Diet 
-resume usual diet; drink plenty of fluids; eat foods high in fiber 
-you may want to take a stool softener (such as Senokot-S or Colace) to prevent constipation while you are taking pain medication.   If constipation occurs, take a laxative (such as Dulcolax tablets, Milk of Magnesia, or a suppository) Home Health Care Protocol (to be followed by [address]) Nursing-per physicians order 
-remove Aquacel dressing at 7 days post-op  
-complete head to toe assessment, vital signs 
-medication reconciliation 
-review pain management 
-manage chronic medical conditions Physical Therapy-per physicians order Weight bearing status: 
Precautions at Admission: Fall, WBAT (Non English Speaking [place]) ) Right Side Weight Bearing: As tolerated Mobility Status: 
Supine to Sit: Minimum assistance (Light Min A to complete upright sitting, RLE management) Sit to Stand: Contact guard assistance (from elevated bed) Sit to Supine: Supervision (RLE gait belt assist into bed) Bed to Chair: Supervision Gait: 
Distance (ft): 25 Feet (ft) Ambulation - Level of Assistance: Contact guard assistance Assistive Device: Gait belt, Walker, rolling Gait Abnormalities: Antalgic, Decreased step clearance, Step to gait ADL status overall composite: Toilet Transfer : Supervision, Adaptive equipment Physical Therapy 
-assessment and evaluation-bed mobility; functional transfers (bed, chair, bathroom, stairs); ambulation with equipment, car transfers, safety and ability to get out of house in the event of an emergency 
-review and maintain weight bearing as tolerated 
-discuss pain management 
-review how to do ADLs 
 
-Home Exercise Program-refer to pages 32-40 of the patient handbook for exercises 
-supine exercises-ankle pumps, hamstring sets, quad sets, heel slides, short arc quad sets, long arc quads-prop heel on pillow for stretch, straight leg raise 
-sitting exercises-active knee flexion, passive knee flexion, active knee extension, ankle pumps, bicep curls (use weights as appropriate), triceps pushups, shoulder flexion (use weights as appropriate) -standing exercises holding onto supportive counter-toe raises, heel raises, mini-squats, heel/toe touches with knee bend, marching in place, hamstring curls Physical therapy goals by discharge from [address] [place] [address] Drive ambulation with walker, crutches or cane if needed (level surfaces and   stairs) -Flexion > 90 degrees, extension 0 degrees 
-Daily performance of home exercise program 
-Independent with stair climbing and car transfers 
-Progress to community ambulator (least restrictive assistive device) Discharge Orders None Introducing [facility]! Peter Sweetwater introduces [employer] patient portal. Now you can access parts of your medical record, email your doctor's office, and request medication refills online. 1. In your internet browser, go to [email or web address] 2. Click on the First Time User? Click Here link in the Sign In box. You will see the New Member Sign Up page. 3. Enter your [employer] Access Code exactly as it appears below. You will not need to use this code after youve completed the sign-up process. If you do not sign up before the expiration date, you must request a new code. · [employer] Access Code: [email or web address] Expires: 6/27/2017  1:18 PM 
 
4. Enter the last four digits of your Social Security Number (xxxx) and Date of Birth (mm/dd/yyyy) as indicated and click Submit. You will be taken to the next sign-up page. 5. Create a [employer] ID. This will be your [employer] login ID and cannot be changed, so think of one that is secure and easy to remember. 6. Create a [employer] password. You can change your password at any time. 7. Enter your Password Reset Question and Answer. This can be used at a later time if you forget your password. 8. Enter your e-mail address. You will receive e-mail notification when new information is available in [address] 9. Click Sign Up. You can now view and download portions of your medical record. 10. Click the Download Summary menu link to download a portable copy of your medical information. If you have questions, please visit the Frequently Asked Questions section of the [employer] website. Remember, MyChart is NOT to be used for urgent needs. For medical emergencies, dial 911. Now available from your iPhone and Android! General Information Please provide this summary of care documentation to your next provider. Patient Signature:  ____________________________________________________________ Date:  ____________________________________________________________  
  
Ovi Arissa Provider Signature:  ____________________________________________________________ Date:  ____________________________________________________________

## 2017-04-03 NOTE — PROGRESS NOTES
Patient has arrived on unit; took vital signs, did nursing assessment; did dual skin check; released orders, taught incentive spirometer; introduced blue phone

## 2017-04-03 NOTE — PROGRESS NOTES
Problem: Mobility Impaired (Adult and Pediatric)  Goal: *Acute Goals and Plan of Care (Insert Text)  Physical Therapy Goals  Initiated 4/3/2017    1. Patient will move from supine to sit and sit to supine in bed with modified independence within 4 days. 2. Patient will perform sit to stand with supervision/set-up within 4 days. 3. Patient will ambulate with supervision/set-up for 150 feet with the least restrictive device within 4 days. 4. Patient will ascend/descend 4 stairs with use of handrail(s) with supervision/set-up within 4 days. 5. Patient will perform home exercise program per protocol with independence within 4 days. 6. Patient will demonstrate AROM 0-90 degrees in operative joint within 4 days. PHYSICAL THERAPY EVALUATION  Patient: Mecca Segura Postal (69 y.o. female)  Date: 4/3/2017  Primary Diagnosis: OSTEOARTHRITIS RIGHT KNEE  Osteoarthritis of right knee  Procedure(s) (LRB):  RIGHT TOTAL KNEE ARTHROPLASTY  (Right) Day of Surgery   Precautions:  Fall, WBAT (Non English Speaking [place]) )      ASSESSMENT :  Based on the objective data described below, the patient presents with decreased R knee ROM/strength, pain with activity and decreased functional independence compared to her baseline.  present and able to translate, however seems to have difficulty translating word for word. It seems that pt was independent prior to admission, but having knee pain. She was able to get to EOB with supervision and stand with min A in order to take side steps to the [facility]. She required increased assist with maneuvering RW and has difficulty following commands due to language barrier. Pt will need RW at discharge and will need to determine options as pt is self pay. Anticipate she will progress well as long as pain under control and should be able to return home with HHPT. Patient will benefit from skilled intervention to address the above impairments.   Patients rehabilitation potential is considered to be Good  Factors which may influence rehabilitation potential include:   [X]         None noted  [ ]         Mental ability/status  [ ]         Medical condition  [ ]         Home/family situation and support systems  [ ]         Safety awareness  [ ]         Pain tolerance/management  [ ]         Other:        PLAN :  Recommendations and Planned Interventions:  [X]           Bed Mobility Training             [ ]    Neuromuscular Re-Education  [X]           Transfer Training                   [ ]    Orthotic/Prosthetic Training  [X]           Gait Training                         [ ]    Modalities  [X]           Therapeutic Exercises           [ ]    Edema Management/Control  [X]           Therapeutic Activities            [X]    Patient and Family Training/Education  [ ]           Other (comment):     Frequency/Duration: Patient will be followed by physical therapy  twice daily to address goals. Discharge Recommendations: Home Health  Further Equipment Recommendations for Discharge: rolling walker       SUBJECTIVE:   Patient does not speak English      OBJECTIVE DATA SUMMARY:   HISTORY:    Past Medical History:   Diagnosis Date    Arthritis      Diabetes ([facility]      Hypertension     History reviewed. No pertinent surgical history.   Prior Level of Function/Home Situation: independent  Personal factors and/or comorbidities impacting plan of care:      Home Situation  Home Environment: Private residence  # Steps to Enter: 1  One/Two Story Residence: Two story  # of Interior Steps: 15  Interior Rails: Left  Living Alone: No  Support Systems: Spouse/Significant Other/Partner  Patient Expects to be Discharged to[de-identified] Private residence  Current DME Used/Available at Home: Glucometer     EXAMINATION/PRESENTATION/DECISION MAKING:   Critical Behavior:  Neurologic State: Alert  Orientation Level: Oriented X4  Cognition: Appropriate decision making, Appropriate for age attention/concentration, Appropriate safety awareness, Follows commands  Safety/Judgement: Awareness of environment  Hearing: Auditory  Auditory Impairment: None  Skin:  Appears intact     Range Of Motion:  AROM: Generally decreased, functional                       Strength:    Strength: Generally decreased, functional                    Tone & Sensation:   Tone: Normal              Sensation: Intact               Coordination:  Coordination: Within functional limits  Functional Mobility:  Bed Mobility:     Supine to Sit: Supervision; Additional time  Sit to Supine: Contact guard assistance     Transfers:  Sit to Stand: Minimum assistance  Stand to Sit: Contact guard assistance           Balance:   Sitting: Intact  Standing: Impaired  Standing - Static: Fair  Standing - Dynamic : Fair  Ambulation/Gait Training:  Distance (ft): 5 Feet (ft) (side steps to [facility])  Assistive Device: Walker, rolling;Gait belt  Ambulation - Level of Assistance: Minimal assistance (assist to maneuver RW)     Gait Description (WDL): Exceptions to WDL  Gait Abnormalities: Antalgic;Decreased step clearance  Right Side Weight Bearing: As tolerated     Base of Support: Narrowed  Stance: Right decreased  Speed/Kyleigh: Slow  Step Length: Right shortened;Left shortened        Pain:  Pain Scale 1: Visual  Pain Intensity 1: 0              Activity Tolerance:   No apparent distress   Please refer to the flowsheet for vital signs taken during this treatment. After treatment:   [ ]         Patient left in no apparent distress sitting up in chair  [X]         Patient left in no apparent distress in bed  [X]         Call bell left within reach  [X]         Nursing notified  [X]         Caregiver present  [ ]         Bed alarm activated      COMMUNICATION/EDUCATION:   The patients plan of care was discussed with: Registered Nurse.  [X]         Fall prevention education was provided and the patient/caregiver indicated understanding.   [X]         Patient/family have participated as able in goal setting and plan of care. [X]         Patient/family agree to work toward stated goals and plan of care. [ ]         Patient understands intent and goals of therapy, but is neutral about his/her participation. [ ]         Patient is unable to participate in goal setting and plan of care.      Thank you for this referral.  Mohinder Worthington, PT   Time Calculation: 30 mins

## 2017-04-03 NOTE — PROGRESS NOTES
Spiritual Care Assessment/Progress Notes    Mecca Larson [id number]  [id number]    1950  78 y.o.  female    Patient Telephone Number: [phone] (home)   Hoahaoism Affiliation: Islam   Language: Western Ching   Extended Emergency Contact Information  Primary Emergency Contact: [address] [facility]  Mobile Phone: [phone]  Relation: Child   Patient Active Problem List    Diagnosis Date Noted    Osteoarthritis of right knee 04/03/2017    [facility] 06/18/2016    Syncope 06/18/2016    CVA, old, hemiparesis ([facility] [address] [facility] 06/18/2016        Date: 4/3/2017       Level of Hoahaoism/Spiritual Activity:  []         Involved in cecil tradition/spiritual practice    []         Not involved in cecil tradition/spiritual practice  []         Spiritually oriented    []         Claims no spiritual orientation    []         seeking spiritual identity  []         Feels alienated from Yazidism practice/tradition  []         Feels angry about Yazidism practice/tradition  []         Spirituality/Yazidism tradition a resource for coping at this time.   [x]         Not able to assess due to medical condition    Services Provided Today:  []         crisis intervention    []         reading Scriptures  []         spiritual assessment    []         prayer  []         empathic listening/emotional support  []         rites and rituals (cite in comments)  []         life review     []         Yazidism support  []         theological development   []         advocacy  []         ethical dialog     []         blessing  []         bereavement support    [x]         support to family  []         anticipatory grief support   []         help with AMD  []         spiritual guidance    []         meditation      Spiritual Care Needs  []         Emotional Support  []         Spiritual/Hoahaoism Care  []         Loss/Adjustment  []         Advocacy/Referral                /Ethics  [x] No needs expressed at               this time  []         Other: (note in               comments)  Spiritual Care Plan  []         Follow up visits with               pt/family  []         Provide materials  []         Schedule sacraments  []         Contact Community               Clergy  [x]         Follow up as needed  []         Other: (note in               comments)     Comments: I was asked by the patient's  if he would be allowed to play the piano in the [address] [place] [address] this morning as he was waiting for his wife's knee replacement surgery to finish. As a pastoral gesture, I gave him permission, and I accompanied him to the [address] [place] [address] where I observed him play two very nice songs. He was very grateful for the opportunity, and I informed him that I would keep his wife in my prayers. His son-in-law and what looked to be his granddaughter were also in the surgical waiting area. This gentleman was wearing a cross and it was apparent to me that cecil is important to him. I was happy to accommodate this request for this gentleman as he awaits his wife's recovery from surgery. Spiritual Care Services remains available if any additional needs arise. Rev. Robyn Carroll M.Div, [facility]

## 2017-04-03 NOTE — PROGRESS NOTES
TRANSFER - IN REPORT:    Verbal report received from [place] pass, RN on [address] [facility] E  being received from PACU for routine post - op      Report consisted of patients Situation, Background, Assessment and   Recommendations(SBAR). Information from the following report(s) SBAR, Kardex and MAR was reviewed with the receiving nurse. Opportunity for questions and clarification was provided. Assessment completed upon patients arrival to unit and care assumed.

## 2017-04-03 NOTE — OP NOTES
Name: Hoa Ruth  MRN:  [id number]  : 1950  Age:  78 y.o. Surgery Date: 4/3/2017      OPERATIVE REPORT - RIGHT TOTAL KNEE REPLACEMENT    PREOPERATIVE DIAGNOSIS: Osteoarthritis, right knee, severe valgus with ligamentous incompetency. POSTOPERATIVE DIAGNOSIS: Osteoarthritis, right knee, severe valgus with ligamentous incompetency. PROCEDURE PERFORMED: Right total knee arthroplasty. SURGEON: Queenie Garcia MD    FIRST ASSISTANTCarlos Alberto Miranda    ANESTHESIA: Spinal    PRE-OP ANTIBIOTIC: Ancef 2g    COMPLICATIONS: None. ESTIMATED BLOOD LOSS: 50 mL. SPECIMENS REMOVED: None. COMPONENTS IMPLANTED:   Implant Name Type Inv.  Item Serial No.  Lot No. LRB No. Used Action   CEMENT BNE GENTAMC MV 40GM -- SMARTSET ENDURANCE - SN/A  CEMENT BNE GENTAMC MV 40GM -- SMARTSET ENDURANCE N/A [facility] [employer] [id number] Right 2 Implanted   EXT STEM TIB KINSEY PFC SIG --  - SN/A  EXT STEM TIB KINSEY PFC SIG --  N/A [facility] [employer] [id number] Right 1 Implanted   BOLT FEM ADAPT NEUT SIGMA --  - SN/A  BOLT FEM ADAPT NEUT SIGMA --  N/A [facility] [employer] [id number] Right 1 Implanted   ADPT FEM STEM SIGMA 5 DEG --  - SN/A  ADPT FEM STEM SIGMA 5 DEG --  N/A [facility] [employer] [id number] Right 1 Implanted   Femoral   N/A [employer] [id number] Right 1 Implanted   TRAY TIB KINSEY REV MBT SZ 2.5 --  - SN/A  TRAY TIB KINSEY REV MBT SZ 2.5 --  N/A [facility] [employer] [id number] Right 1 Implanted   PAT DOME OV PFC 3PEG 35MM -- SIGMA - SN/A  PAT DOME OV PFC 3PEG 35MM -- SIGMA N/A [facility] [employer] [id number] Right 1 Implanted   CEMENT BNE GENTAMC MV 40GM -- SMARTSET ENDURANCE - SN/A  CEMENT BNE GENTAMC MV 40GM -- SMARTSET ENDURANCE N/A [facility] [employer] [id number] Right 1 Implanted   Prep Kit w/ Restrictors   N/A [employer] [id number] Right 1 Implanted   Tibial Insert     N/A [employer] [id number] Right 1 Implanted       INDICATIONS:  The patient is an 78 yrs female with progressive debilitating right knee pain due to severe osteoarthritis. Symptoms have progressed despite comprehensive conservative treatment and they presents for right total knee replacement. Risks, benefits, alternatives of the procedure were reviewed in detail and the patient desired to proceed. Risks including bleeding, infection, damage to surrounding structures, blood clots, pulmonary embolism, and death were discussed. DESCRIPTION OF PROCEDURE: epidural/spinal anesthesia was initiated. Two grams of cefazolin were administered within 30 minutes of incision. A rod catheter was placed. The right lower extremity was prepped and draped in the usual sterile fashion. The skin was covered with Ioban occlusive dressing. The right lower extremity was exsanguinated and tourniquet inflated to 300 mmHG. A midline skin incision was made with a 10 blade and small flaps were elevated. A medial parapatellar incision was made to the right knee. The knee was exposed and the distal femur was cut at 5 degrees distal femoral valgus. The tibia was subluxed and a neutral varus/valgus proximal tibial cut was made matching the patient's slope. The meniscal remnants were removed. The posterior osteophytes were removed from the femur. The extension gap was determined using spacer blocks and appropriate releases were made. Femur was sized per above. An AP cutting block was placed, rotated using a gap balancing techniqe. Anterior, posterior, and chamfer cuts were carried out. A box cut was then carried out for the posterior stabilized implant. The tibial was then sized and correct rotation was identified using the medial 1/3 of the tibial tubercle as a landmark. The tibia was prepared using a drill followed by a keel punch. A reciprocating saw was used to begin the cuts for the keel punch to avoid tibial fracture. Trials were placed. The MCL was noted to be completely incompetent.  At this point the decision was made to switch to the TC-3 implant for additional constraint. The femur and tibia were re-prepared for this implant. The patella was sized using a caliper (__ mm) and an appropriate resection (_ mm) was performed. Lug holes were drilled and a trial was fitted. The knee tracked well with all trial implants. A stem was used on the femoral side. We attempted to place a tibial stem however there was severe valgus bow of the tibia and the stem abutted the lateral cortex even with reposition. The trials were then removed. Bony surfaces were drilled, lavaged, and dried. All components were cemented. Excess cement was removed. Polyethylene component was placed. After the cement had fully cured, the knee was ranged and  irrigated copiously with pulsatile lavage. All surrounding soft tissues were infiltrated with local anesthetic. The arthrotomy  was closed with running quill suture and 0 vicryl suture. Skin and subcutaneous tissues were irrigated and closed in standard fashion with 2-0 vicryl and 3-0 monocryl. An aquacel dressing was placed. There were no complications. The procedure was a RIGHT TOTAL KNEE REPLACEMENT. A DePuy total knee construct was utilized. No specimens were obtained/sent. The patient was transferred to the recovery room in stable condition.       Navneet Nobles MD

## 2017-04-03 NOTE — ANESTHESIA POSTPROCEDURE EVALUATION
Post-Anesthesia Evaluation and Assessment    Patient: Shaylee Lopez MRN: [id number]  SSN: [id number]    YOB: 1950  Age: 78 y.o. Sex: female       Cardiovascular Function/Vital Signs  Visit Vitals    /89    Pulse 87    Temp 36.3 °C (97.4 °F)    Resp 20    Ht 5' 4\" (1.626 m)    Wt 93.4 kg (206 lb)    SpO2 100%    BMI 35.36 kg/m2       Patient is status post spinal anesthesia for Procedure(s):  RIGHT TOTAL KNEE ARTHROPLASTY . Nausea/Vomiting: None    Postoperative hydration reviewed and adequate. Pain:  Pain Scale 1: Numeric (0 - 10) (04/03/17 1200)  Pain Intensity 1: 0 (04/03/17 1200)   Managed    Neurological Status:   Neuro (WDL): Within Defined Limits (04/03/17 1137)   At baseline    Mental Status and Level of Consciousness: Arousable    Pulmonary Status:   O2 Device: Nasal cannula (04/03/17 1137)   Adequate oxygenation and airway patent    Complications related to anesthesia: None    Post-anesthesia assessment completed.  No concerns    Signed By: Moriah Yap MD     April 3, 2017

## 2017-04-03 NOTE — PERIOP NOTES
TRANSFER - OUT REPORT:    Verbal report given to [facility] RN on [address] [facility] E  being transferred to  23 07 for routine post - op       Report consisted of patients Situation, Background, Assessment and   Recommendations(SBAR). Time Pre op antibiotic [facility]:6119  Anesthesia Stop time: 2408  Ray Present on Transfer to floor:yes  Order for Ray on Chart:yes    Information from the following report(s) SBAR, OR Summary, Intake/Output and MAR was reviewed with the receiving nurse. Opportunity for questions and clarification was provided. Is the patient on 02? NO       L/Min        Other     Is the patient on a monitor? NO    Is the nurse transporting with the patient? NO    Surgical Waiting Area notified of patient's transfer from PACU? YES,  with pt in PACU      The following personal items collected during your admission accompanied patient upon transfer:   Dental Appliance: Dental Appliances: None  Vision: Visual Aid: Glasses  Hearing Aid:    Jewelry: Jewelry: None  Clothing: Clothing: Other (comment) (CLOTHING & SHOES TO PACU)  Other Valuables:  Other Valuables: None  Valuables sent to safe:

## 2017-04-04 ENCOUNTER — HOME HEALTH ADMISSION (OUTPATIENT)
Dept: HOME HEALTH SERVICES | Facility: HOME HEALTH | Age: 67
End: 2017-04-04
Payer: COMMERCIAL

## 2017-04-04 LAB
ANION GAP BLD CALC-SCNC: 9 MMOL/L (ref 5–15)
BUN SERPL-MCNC: 16 MG/DL (ref 6–20)
BUN/CREAT SERPL: 16 (ref 12–20)
CALCIUM SERPL-MCNC: 7.8 MG/DL (ref 8.5–10.1)
CHLORIDE SERPL-SCNC: 107 MMOL/L (ref 97–108)
CO2 SERPL-SCNC: 24 MMOL/L (ref 21–32)
CREAT SERPL-MCNC: 0.98 MG/DL (ref 0.55–1.02)
GLUCOSE BLD STRIP.AUTO-MCNC: 186 MG/DL (ref 65–100)
GLUCOSE BLD STRIP.AUTO-MCNC: 199 MG/DL (ref 65–100)
GLUCOSE BLD STRIP.AUTO-MCNC: 235 MG/DL (ref 65–100)
GLUCOSE SERPL-MCNC: 168 MG/DL (ref 65–100)
HGB BLD-MCNC: 8.2 G/DL (ref 11.5–16)
POTASSIUM SERPL-SCNC: 3.8 MMOL/L (ref 3.5–5.1)
SERVICE CMNT-IMP: ABNORMAL
SODIUM SERPL-SCNC: 140 MMOL/L (ref 136–145)

## 2017-04-04 PROCEDURE — 85018 HEMOGLOBIN: CPT | Performed by: ORTHOPAEDIC SURGERY

## 2017-04-04 PROCEDURE — 97535 SELF CARE MNGMENT TRAINING: CPT

## 2017-04-04 PROCEDURE — 97530 THERAPEUTIC ACTIVITIES: CPT

## 2017-04-04 PROCEDURE — 82962 GLUCOSE BLOOD TEST: CPT

## 2017-04-04 PROCEDURE — 97116 GAIT TRAINING THERAPY: CPT

## 2017-04-04 PROCEDURE — 36415 COLL VENOUS BLD VENIPUNCTURE: CPT | Performed by: ORTHOPAEDIC SURGERY

## 2017-04-04 PROCEDURE — 97165 OT EVAL LOW COMPLEX 30 MIN: CPT

## 2017-04-04 PROCEDURE — 74011250636 HC RX REV CODE- 250/636: Performed by: ORTHOPAEDIC SURGERY

## 2017-04-04 PROCEDURE — 74011250637 HC RX REV CODE- 250/637: Performed by: ORTHOPAEDIC SURGERY

## 2017-04-04 PROCEDURE — 65270000029 HC RM PRIVATE

## 2017-04-04 PROCEDURE — 80048 BASIC METABOLIC PNL TOTAL CA: CPT | Performed by: ORTHOPAEDIC SURGERY

## 2017-04-04 RX ADMIN — ACETAMINOPHEN 650 MG: 325 TABLET ORAL at 07:33

## 2017-04-04 RX ADMIN — SODIUM CHLORIDE 125 ML/HR: 900 INJECTION, SOLUTION INTRAVENOUS at 05:45

## 2017-04-04 RX ADMIN — KETOROLAC TROMETHAMINE 15 MG: 30 INJECTION, SOLUTION INTRAMUSCULAR at 00:37

## 2017-04-04 RX ADMIN — OXYCODONE HYDROCHLORIDE 5 MG: 5 TABLET ORAL at 09:25

## 2017-04-04 RX ADMIN — LISINOPRIL 20 MG: 20 TABLET ORAL at 09:21

## 2017-04-04 RX ADMIN — LOVASTATIN 40 MG: 20 TABLET ORAL at 22:19

## 2017-04-04 RX ADMIN — KETOROLAC TROMETHAMINE 15 MG: 30 INJECTION, SOLUTION INTRAMUSCULAR at 12:25

## 2017-04-04 RX ADMIN — Medication 10 ML: at 07:33

## 2017-04-04 RX ADMIN — ACETAMINOPHEN 650 MG: 325 TABLET ORAL at 17:25

## 2017-04-04 RX ADMIN — KETOROLAC TROMETHAMINE 15 MG: 30 INJECTION, SOLUTION INTRAMUSCULAR at 07:33

## 2017-04-04 RX ADMIN — DOCUSATE SODIUM AND SENNOSIDES 1 TABLET: 8.6; 5 TABLET, FILM COATED ORAL at 17:26

## 2017-04-04 RX ADMIN — METFORMIN HYDROCHLORIDE: 500 TABLET, FILM COATED ORAL at 17:26

## 2017-04-04 RX ADMIN — HYDROCHLOROTHIAZIDE 25 MG: 25 TABLET ORAL at 09:21

## 2017-04-04 RX ADMIN — CEFAZOLIN 2 G: 1 INJECTION, POWDER, FOR SOLUTION INTRAMUSCULAR; INTRAVENOUS; PARENTERAL at 00:34

## 2017-04-04 RX ADMIN — ACETAMINOPHEN 650 MG: 325 TABLET ORAL at 00:36

## 2017-04-04 RX ADMIN — OXYCODONE HYDROCHLORIDE 5 MG: 5 TABLET ORAL at 00:35

## 2017-04-04 RX ADMIN — POLYETHYLENE GLYCOL 3350 17 G: 17 POWDER, FOR SOLUTION ORAL at 09:22

## 2017-04-04 RX ADMIN — ACETAMINOPHEN 650 MG: 325 TABLET ORAL at 12:24

## 2017-04-04 RX ADMIN — Medication 10 ML: at 14:34

## 2017-04-04 RX ADMIN — DOCUSATE SODIUM AND SENNOSIDES 1 TABLET: 8.6; 5 TABLET, FILM COATED ORAL at 09:22

## 2017-04-04 RX ADMIN — ASPIRIN 325 MG: 325 TABLET, DELAYED RELEASE ORAL at 17:26

## 2017-04-04 RX ADMIN — FAMOTIDINE 20 MG: 20 TABLET, FILM COATED ORAL at 17:25

## 2017-04-04 RX ADMIN — ASPIRIN 325 MG: 325 TABLET, DELAYED RELEASE ORAL at 09:21

## 2017-04-04 RX ADMIN — CELECOXIB 200 MG: 200 CAPSULE ORAL at 17:26

## 2017-04-04 RX ADMIN — FAMOTIDINE 20 MG: 20 TABLET, FILM COATED ORAL at 09:21

## 2017-04-04 RX ADMIN — Medication 10 ML: at 22:20

## 2017-04-04 NOTE — DIABETES MGMT
DTC Post Surgical Education Note    Chart reviewed and initial evaluation complete on Mecca de Omar. Assessed and instructed patient on the following interpretation of lab results, blood sugar goals, hypoglycemia prevention and treatment, exercise, nutrition and referred to Diabetes Educator, risk of infection/ delayed healing. Pt's language is Western Ching, her daughter at bedside speaks Marivel Funes. I talked to pt's daughter to assess home management. Patient is a 78 y.o. female with hx Type 2 Diabetes on Janumet  mg bid at home. Pt usually eats 2-3 meals a day and has a PCP [facility]. BG monitoring at home once a day. Patient reports BG levels at home fasting range: 130-147 mg/dl. Provided patient with the following:              [x]       Post surgery BG management guidelines                              [x]        Outpatient DTC contact number                   A1c:   POC Glucose last 24hrs: Lab Results   Component Value Date/Time    Glucose (POC) 199 04/04/2017 11:15 AM    Glucose (POC) 186 04/04/2017 06:25 AM    Glucose (POC) 224 04/03/2017 10:03 PM    Glucose (POC) 154 04/03/2017 04:27 PM    Glucose (POC) 102 04/03/2017 01:19 PM       Recent Glucose Results: Lab Results   Component Value Date/Time     (H) 04/04/2017 05:03 AM    GLUCPOC 199 (H) 04/04/2017 11:15 AM    GLUCPOC 186 (H) 04/04/2017 06:25 AM    GLUCPOC 224 (H) 04/03/2017 10:03 PM        Lab Results   Component Value Date/Time    Creatinine 0.98 04/04/2017 05:03 AM       Active Orders   Diet    DIET REGULAR        PO intake: Patient Vitals for the past 72 hrs:   % Diet Eaten   04/04/17 1331 100 %   04/04/17 0854 50 %       Current hospital DM medication: Janumet  mg BID    Will continue to follow as needed.     Thank you,    Burgess Danelle Hill RD

## 2017-04-04 NOTE — PROGRESS NOTES
Care Management Interventions  PCP Verified by CM: Yes (Dr. Rajendra Barker )  Palliative Care Consult (Criteria: CHF and RRAT>21): No  Reason for No Palliative Care Consult: Other (see comment) (no needs)  Mode of Transport at Discharge: Self  Transition of Care Consult (CM Consult): [address]: Yes  Discharge Durable Medical Equipment: Yes (RW)  Health Maintenance Reviewed: Yes  Physical Therapy Consult: Yes  Occupational Therapy Consult: Yes  Speech Therapy Consult: No  Current Support Network: Lives with Spouse  Confirm Follow Up Transport: Family  Plan discussed with Pt/Family/Caregiver: Yes  Freedom of Choice Offered: Yes  Discharge Location  Discharge Placement: Home with home health    Home care orders noted. CRM sent referral to [facility] via [address] [facility]. The patient is self pay/phil. CRM will follow. Rhoda Randolph has accepted the case.  FABIANO

## 2017-04-04 NOTE — DIABETES MGMT
DTC Progress Note    Recommendations/ Comments:  Chart reviewed on Mecca de Omar for hyperglycemia, likely due to s/p right total knee arthroplasty on 4/3/2017. If appropriate, please consider:  -  Adding Humalog for correction, normal sensitivity scale  - Adding carbohydrate consistent to current diet    Patient is a 78 y.o. female with hx Type 2 Diabetes on Janumet  mg bid at home. A1c:   Lab Results   Component Value Date/Time    Hemoglobin A1c 7.5 03/29/2017 02:03 PM    Hemoglobin A1c 7.8 10/21/2016 10:38 AM       Recent Glucose Results: Lab Results   Component Value Date/Time     (H) 04/04/2017 05:03 AM    GLUCPOC 186 (H) 04/04/2017 06:25 AM    GLUCPOC 224 (H) 04/03/2017 10:03 PM    GLUCPOC 154 (H) 04/03/2017 04:27 PM        Lab Results   Component Value Date/Time    Creatinine 0.98 04/04/2017 05:03 AM       Active Orders   Diet    DIET REGULAR        PO intake: Patient Vitals for the past 72 hrs:   % Diet Eaten   04/04/17 0854 50 %       Current hospital DM medication: Janumet  mg BID    Will continue to follow as needed.     Thank you,   Jeannette Hill RD

## 2017-04-04 NOTE — PROGRESS NOTES
Bedside and Verbal shift change report given to Robert Bradshaw RN (oncoming nurse) by Nicole Loaiza RN(offgoing nurse). Report included the following information SBAR, Kardex and MAR.

## 2017-04-04 NOTE — PROGRESS NOTES
Bedside and Verbal shift change report given to Trixie Celeste, [employer] [place] (oncoming nurse) by Talha Alegria RN (offgoing nurse). Report included the following information SBAR, Kardex, OR Summary, Procedure Summary, Intake/Output, MAR and Recent Results.

## 2017-04-04 NOTE — PROGRESS NOTES
Bedside and Verbal shift change report given to Rodolfo mena RN (oncoming nurse) by Igor Chandler RN (offgoing nurse). Report included the following information SBAR and Kardex.

## 2017-04-04 NOTE — PROGRESS NOTES
Problem: Mobility Impaired (Adult and Pediatric)  Goal: *Acute Goals and Plan of Care (Insert Text)  Physical Therapy Goals  Initiated 4/3/2017    1. Patient will move from supine to sit and sit to supine in bed with modified independence within 4 days. 2. Patient will perform sit to stand with supervision/set-up within 4 days. 3. Patient will ambulate with supervision/set-up for 150 feet with the least restrictive device within 4 days. 4. Patient will ascend/descend 4 stairs with use of handrail(s) with supervision/set-up within 4 days. 5. Patient will perform home exercise program per protocol with independence within 4 days. 6. Patient will demonstrate AROM 0-90 degrees in operative joint within 4 days. PHYSICAL THERAPY TREATMENT  Patient: Mecca Avitia (69 y.o. female)  Date: 4/4/2017  Diagnosis: OSTEOARTHRITIS RIGHT KNEE  Osteoarthritis of right knee <principal problem not specified>  Procedure(s) (LRB):  RIGHT TOTAL KNEE ARTHROPLASTY  (Right) 1 Day Post-Op  Precautions: Fall, WBAT (Non English Speaking [place]) )  Chart, physical therapy assessment, plan of care and goals were reviewed. ASSESSMENT:  Pt received sitting up in chair w/  present (RN informed PT pt had been up in chair since 8AM and pt ready to get back to bed). Pt does not speak [place] and  translating in [place] (pt  doing best he could translating as his English is not as fluent either). Pt agreeable to PT, performed seated exercises (x10) w/ demonstration and light tactile cuing. Pt sit<>stand x 2, requiring Min A for stand x1 and CGA for stand x2 from elevated bed (pt amb from chair to opposite side of bed for seated rest break d/t pain prior to gait training in [facility]). Pt amb approx 50 FT total w/RW,CGA w/ antalgic and \"hop to gait\" as pt w/ decreased WBing on RLE. Pt returned to sitting EOB, able to partially stand to side step to HOB (SBA), then returned supine to bed (Light Min A for RLE into bed). Will plan for additional gait training later today; may attempt stair training w/ pt as tolerated. Progression toward goals:  [ ]      Improving appropriately and progressing toward goals  [X]      Improving slowly and progressing toward goals  [ ]      Not making progress toward goals and plan of care will be adjusted       PLAN:  Patient continues to benefit from skilled intervention to address the above impairments. Continue treatment per established plan of care. Discharge Recommendations:  Home Health   Further Equipment Recommendations for Discharge:  rolling walker       SUBJECTIVE:   Patient dies not speak fluent English; pt able to mouthed \"back to bed\" at sessions end. OBJECTIVE DATA SUMMARY:   Critical Behavior:  Neurologic State: Alert  Orientation Level: Disoriented X4  Cognition: Follows commands, Poor safety awareness  Safety/Judgement: Awareness of environment                             Functional Mobility Training:  Bed Mobility:     Supine to Sit:  (pt received sitting in chair)  Sit to Supine: Minimum assistance (Light Min A for RLE assist into bed)                       Transfers:  Sit to Stand: Contact guard assistance;Minimum assistance (sit<>stand x2;Min A from chair CGA from elevated bed)  Stand to Sit: Contact guard assistance                             Balance:  Sitting: Intact  Standing: Intact; With support  Ambulation/Gait Training:  Distance (ft): 25 Feet (ft) (x2)  Assistive Device: Gait belt;Walker, rolling  Ambulation - Level of Assistance: Contact guard assistance        Gait Abnormalities: Antalgic;Decreased step clearance; Step to gait (\"hop to\" gait)  Right Side Weight Bearing: As tolerated        Stance: Right decreased  Speed/Kyleigh: Pace decreased (<100 feet/min); Slow  Step Length: Left shortened;Right shortened                               Stairs:            Therapeutic Exercises:     EXERCISE   Sets   Reps   Active Active Assist   Passive Self ROM   Comments Ankle Pumps     [ ]                                [ ]                                [ ]                                [ ]                                    Quad Sets     [ ]                                [ ]                                [ ]                                [ ]                                    Hamstring Sets     [ ]                                [ ]                                [ ]                                [ ]                                    Griselda Pedroza     [ ]                                [ ]                                [ ]                                [ ]                                    Yanelis Mendez       [ ]                                  [ ]                                  [ ]                                  [ ]                                    Jeromy Rosales     [ ]                                [ ]                                [ ]                                [ ]                                    Cintia Lambing   10 [X]                                [ ]                                [ ]                                [ ]                                    Knee Flexion Stretch   10 [X]                                [ ]                                [ ]                                [ ]                                    Straight Leg Raises     [ ]                                [ ]                                [ ]                                [ ]                                          Pain:  Pain Scale 1: FACES  Pain Intensity 1: 0  Pain Location 1: Knee  Pain Orientation 1: Right  Pain Description 1: Aching  Pain Intervention(s) 1: Elevation; Family Support  Activity Tolerance:   Fair  Please refer to the flowsheet for vital signs taken during this treatment.   After treatment:   [ ] Patient left in no apparent distress sitting up in chair  [X] Patient left in no apparent distress in bed  [X] Call bell left within reach  [X] Nursing notified  [X] Caregiver present (pt  present, translating throughout session)  [ ] Bed alarm activated      COMMUNICATION/COLLABORATION:   The patients plan of care was discussed with: Registered Nurse Darden Carrel A Means,PTA   Time Calculation: 35 mins

## 2017-04-04 NOTE — PROGRESS NOTES
Problem: Discharge Planning  Goal: *Discharge to safe environment  Outcome: [facility]

## 2017-04-04 NOTE — PROGRESS NOTES
After giving [facility] at 1908, patient's daughter said that patient took (at 1800 hours) 20 mg Lisinopril, 1 Januvia tablet, and 400 mg Ibuprofen. Passed information on to charge nurse and had daughter take home medications after explaining to patient that we would give all medications and that she cannot take ibuprofen.

## 2017-04-04 NOTE — PROGRESS NOTES
Problem: Mobility Impaired (Adult and Pediatric)  Goal: *Acute Goals and Plan of Care (Insert Text)  Physical Therapy Goals  Initiated 4/3/2017    1. Patient will move from supine to sit and sit to supine in bed with modified independence within 4 days. 2. Patient will perform sit to stand with supervision/set-up within 4 days. 3. Patient will ambulate with supervision/set-up for 150 feet with the least restrictive device within 4 days. 4. Patient will ascend/descend 4 stairs with use of handrail(s) with supervision/set-up within 4 days. 5. Patient will perform home exercise program per protocol with independence within 4 days. 6. Patient will demonstrate AROM 0-90 degrees in operative joint within 4 days. PHYSICAL THERAPY TREATMENT  Patient: Afi de Ivan Dakins (69 y.o. female)  Date: 4/4/2017  Diagnosis: OSTEOARTHRITIS RIGHT KNEE  Osteoarthritis of right knee <principal problem not specified>  Procedure(s) (LRB):  RIGHT TOTAL KNEE ARTHROPLASTY  (Right) 1 Day Post-Op  Precautions: Fall, WBAT (Non English Speaking [place]) )  Chart, physical therapy assessment, plan of care and goals were reviewed. ASSESSMENT:  Pt received supine in bed, pt family members including  present throughout session; pt daughter [facility]. Pt improved gait distance and gait this afternoon as pt able to amb approx 80 FT total (RW, CGA) w/ slightly increased step length and increased WBing through RLE (pt not \"hopping\" during gait); pt did require 2 standing rest breaks due to fatigue and pain. Pt returned supine to bed at request, but encouraged pt to be up in chair for dinner/meals w/assist; applied rolled up sheet to lateral aspect of RLE to prevent external rotation which was noted as in supine. Pt will need to perform and clear stairs tomorrow prior to d/c; pt planning to return to [facility] (0 MIGUEL, 14 steps interior).    Progression toward goals:  [X]      Improving appropriately and progressing toward goals  [ ]      Improving slowly and progressing toward goals  [ ]      Not making progress toward goals and plan of care will be adjusted       PLAN:  Patient continues to benefit from skilled intervention to address the above impairments. Continue treatment per established plan of care. Discharge Recommendations:  Home Health  Further Equipment Recommendations for Discharge:  rolling walker       SUBJECTIVE:   Pt w/ shocked look on her face at PT arrival.       OBJECTIVE DATA SUMMARY:   Range of Motion:  AROM: Within functional limits                       Functional Mobility Training:  Bed Mobility:     Supine to Sit: Supervision  Sit to Supine: Minimum assistance (RLE into bed)                       Transfers:  Sit to Stand: Stand-by asssistance  Stand to Sit: Stand-by asssistance        Bed to Chair: Supervision                    Ambulation/Gait Training:  Distance (ft): 80 Feet (ft) (40 Ft x2)  Assistive Device: Gait belt;Walker, rolling  Ambulation - Level of Assistance: Contact guard assistance        Gait Abnormalities: Antalgic;Decreased step clearance; Step to gait  Right Side Weight Bearing: As tolerated     Base of Support: Narrowed  Stance: Right decreased  Speed/Kyleigh: Pace decreased (<100 feet/min); Slow  Step Length: Left shortened;Right shortened                               Stairs:     Stairs - Level of Assistance:  (will plan for stair training tomorrow 4/5/17)                 Therapeutic Exercises:   Exercises performed per protocol. See morning treatment note for description. Pain:  Pain Scale 1: FACES  Pain Intensity 1: 0  Pain Location 1: Knee  Pain Orientation 1: Right  Pain Description 1: Aching  Pain Intervention(s) 1: Elevation; Family Support  Activity Tolerance:   Fair  Please refer to the flowsheet for vital signs taken during this treatment.   After treatment:   [ ] Patient left in no apparent distress sitting up in chair  [X] Patient left in no apparent distress in bed  [X] Call bell left within reach  [X] Nursing notified  [X] Caregiver present (pt  and pt dtr present)  [ ] Bed alarm activated      COMMUNICATION/COLLABORATION:   The patients plan of care was discussed with: Registered Nurse Kathleen LONGORIA MeansPTA   Time Calculation: 19 mins

## 2017-04-04 NOTE — PROGRESS NOTES
Occupational Therapy EVALUATION/discharge  Patient: Mecca Evangelista (69 y.o. female)  Date: 4/4/2017  Primary Diagnosis: OSTEOARTHRITIS RIGHT KNEE  Osteoarthritis of right knee  Procedure(s) (LRB):  RIGHT TOTAL KNEE ARTHROPLASTY  (Right) 1 Day Post-Op   Precautions:  Fall, WBAT (Non English Speaking [place]) )    ASSESSMENT:   Based on the objective data described below, the patient presents with expected impairments following R TKA, POD 1. She speaks [place] and her  was present and assisted with translation and use of face pain scale. They live in [place] and came to visit their daughter here in [place] and have had an extended stay secondary to health complications. PTA she was independent in all ADL tasks without AD. Provided education to  who translated to patient regarding home safety, RW use, toileting tasks, and LB dressing. Fair understanding. She is able to complete toileting at SBA, LB dressing for pant/underwear with SBA, socks with min A for RLE, and standing at sink for grooming with supervision. Demonstrates slow pace with mobility to bathroom needing additional time with SBA. Her  reports he will be home with her and feels comfortable assisting with self care tasks. Further skilled acute occupational therapy is not indicated at this time. All questions answered and patient and  in agreement to dc from OT. Discharge Recommendations: Home Health vs none depending on patient's preference  Further Equipment Recommendations for Discharge: RW      SUBJECTIVE:   Patient stated Marshal Brood.     OBJECTIVE DATA SUMMARY:   HISTORY:   Past Medical History:   Diagnosis Date    Arthritis     Diabetes ([address]     Hypertension    History reviewed. No pertinent surgical history. Prior Level of Function/Home Situation: Staying with daughter in [place] prior to returning home to [place]. Independent with ADL tasks without AD.      Expanded or extensive additional review of patient history:     Home Situation  Home Environment: Private residence  # Steps to Enter: 1  One/Two Story Residence: Two story  # of Interior Steps: 15  Interior Rails: Left  Living Alone: No  Support Systems: Spouse/Significant Other/Partner  Patient Expects to be Discharged to[de-identified] Private residence  Current DME Used/Available at Home: Glucometer  Tub or Shower Type: Shower  [x]  Right hand dominant   []  Left hand dominant    EXAMINATION OF PERFORMANCE DEFICITS:  Cognitive/Behavioral Status:  Neurologic State: Alert  Orientation Level: Disoriented X4  Cognition: Follows commands;Poor safety awareness       Skin: uppers intact    Edema: uppers none    Hearing: Auditory  Auditory Impairment: Hard of hearing, bilateral, Hearing aid(s)  Hearing Aids/Status: Right    Vision/Perceptual:    Acuity: Within Defined Limits         Range of Motion:  AROM: Within functional limits- BUE       Strength:  Strength: Generally decreased, functional       Coordination:  Coordination: Within functional limits  Fine Motor Skills-Upper: Left Intact; Right Intact    Gross Motor Skills-Upper: Left Intact; Right Intact    Tone & Sensation:  Tone: Normal  Sensation: Intact          Balance:  Sitting: Intact  Standing: Intact; With support    Functional Mobility and Transfers for ADLs:  Bed Mobility:  Supine to Sit: Supervision  Sit to Supine: Minimum assistance    Transfers:  Sit to Stand: Supervision  Stand to Sit: Supervision  Bed to Chair: Supervision  Toilet Transfer : Supervision; Adaptive equipment    ADL Assessment:  Feeding: Independent  Oral Facial Hygiene/Grooming: Supervision (standing)  Bathing: Minimum assistance  Upper Body Dressing: Independent  Lower Body Dressing: Minimum assistance;Contact guard assistance  Toileting: Stand by assistance        ADL Intervention and task modifications:  Bathing: Patient instructed when bathing to not submerge wound in water, stand to shower or sponge bathe, cover wound with plastic and tape to ensure no water reaches bandage/wound without cues. Patient indicated understanding. Dressing joint: Patient instructed to don/doff RLE first/last.  Patient instructed to don all clothing while sitting prior to standing, doff all clothing to knees while standing, then sit to doff clothing off from knees to feet in order to facilitate fall prevention, pain management, and energy conservation. Patient indicated understanding/recalled strategies with minimal cues. She needing min A for R distal aspects of socks/shoe, patient's  reports he will be at home to assist as needed. They live with their daughter that works but when she is home she can assist.  Home safety: Patient instructed on home modifications and safety (raise height of ADL objects, appropriate height of chair surfaces, recliner safety, change of floor surfaces, clear pathways) to increase independence and fall prevention. Patient's  indicated understanding. Standing: Patient instructed to walk up to sink/counter top/surfaces, step into walker to increase safety of joint and fall prevention. Instructed to apply concept to ADLs within the home (no reaching across body to L side, square off while using objects, slide objects along surfaces). Patient instructed to increase amount of time standing, observe standing position during ADLs in order to increase even weight bearing through bilateral LEs in order to increase independence with ADLs. Patient indicated understanding.       Grooming  Washing Hands: Supervision/set-up    Lower Body Dressing Assistance  Socks: Minimum assistance (RLE)    Toileting  Toileting Assistance: Supervision/set up  Bladder Hygiene: Supervision/set-up  Bowel Hygiene: Supervision/set-up  Clothing Management: Supervision/set-up  Adaptive Equipment: Walker    Functional Measure:  Barthel Index:    Bathin  Bladder: 10  Bowels: 10  Groomin  Dressin  Feeding: 10  Mobility: 0  Stairs: 0  Toilet Use: 10  Transfer (Bed to Chair and Back): 10  Total: 60       Barthel and G-code impairment scale:  Percentage of impairment CH  0% CI  1-19% CJ  20-39% CK  40-59% CL  60-79% CM  80-99% CN  100%   Barthel Score 0-100 100 99-80 79-60 59-40 20-39 1-19   0   Barthel Score 0-20 20 17-19 13-16 9-12 5-8 1-4 0      The Barthel ADL Index: Guidelines  1. The index should be used as a record of what a patient does, not as a record of what a patient could do. 2. The main aim is to establish degree of independence from any help, physical or verbal, however minor and for whatever reason. 3. The need for supervision renders the patient not independent. 4. A patient's performance should be established using the best available evidence. Asking the patient, friends/relatives and nurses are the usual sources, but direct observation and common sense are also important. However direct testing is not needed. 5. Usually the patient's performance over the preceding 24-48 hours is important, but occasionally longer periods will be relevant. 6. Middle categories imply that the patient supplies over 50 per cent of the effort. 7. Use of aids to be independent is allowed. Dawit Suarez., Barthel, D.W. (0160). Functional evaluation: the Barthel Index. [facility] (14)2. FELIBERTO Ledbetter, Remigio Kan., Pascale Tung., Ruel Rice, [address] [place] [address] (1999). Measuring the change indisability after inpatient rehabilitation; comparison of the responsiveness of the Barthel Index and Functional [place] Measure. Journal of Neurology, Neurosurgery, and Psychiatry, 66(4), 688-601. Yesenia Constantino, N.ANAIS.A, JARRET Duran, & Ismael Roger MCLYDE. (2004.) Assessment of post-stroke quality of life in cost-effectiveness studies: The usefulness of the Barthel Index and the EuroQoL-5D. Quality of Life Research, 13, 578-86         G codes:   In compliance with CMSs Claims Based Outcome Reporting, the following G-code set was chosen for this patient based on their primary functional limitation being treated: The outcome measure chosen to determine the severity of the functional limitation was the Barthel Index with a score of 60/100 which was correlated with the impairment scale. ? Self Care:     - CURRENT STATUS: CJ - 20%-39% impaired, limited or restricted    - GOAL STATUS: CJ - 20%-39% impaired, limited or restricted    - D/C STATUS:  CJ - 20%-39% impaired, limited or restricted     Occupational Therapy Evaluation Charge Determination   History Examination Decision-Making   LOW Complexity : Brief history review  LOW Complexity : 1-3 performance deficits relating to physical, cognitive , or psychosocial skils that result in activity limitations and / or participation restrictions  LOW Complexity : No comorbidities that affect functional and no verbal or physical assistance needed to complete eval tasks       Based on the above components, the patient evaluation is determined to be of the following complexity level: LOW   Pain:  Pain Scale 1: FACES  Pain Intensity 1: 0  Pain Location 1: Knee  Pain Orientation 1: Right  Pain Description 1: Aching  Pain Intervention(s) 1: Elevation; Family Support  Activity Tolerance:   Good with slow pace for toileting tasks and LB dressing training. After treatment:   []  Patient left in no apparent distress sitting up in chair  [x]  Patient left in no apparent distress in bed  [x]  Call bell left within reach  [x]  Nursing notified  [x]  Caregiver present  []  Bed alarm activated    COMMUNICATION/EDUCATION:   Communication/Collaboration:  [x]      Home safety education was provided and the patient/caregiver indicated understanding. [x]      Patient/family have participated as able and agree with findings and recommendations. []      Patient is unable to participate in plan of care at this time.   Findings and recommendations were discussed with: Physical Therapy Assistant, Registered Nurse and Patient    Sushila Gambino, OT  Time Calculation: 22 mins

## 2017-04-05 LAB
GLUCOSE BLD STRIP.AUTO-MCNC: 181 MG/DL (ref 65–100)
GLUCOSE BLD STRIP.AUTO-MCNC: 190 MG/DL (ref 65–100)
GLUCOSE BLD STRIP.AUTO-MCNC: 210 MG/DL (ref 65–100)
HGB BLD-MCNC: 8.6 G/DL (ref 11.5–16)
SERVICE CMNT-IMP: ABNORMAL

## 2017-04-05 PROCEDURE — 97116 GAIT TRAINING THERAPY: CPT

## 2017-04-05 PROCEDURE — 97530 THERAPEUTIC ACTIVITIES: CPT

## 2017-04-05 PROCEDURE — 65270000029 HC RM PRIVATE

## 2017-04-05 PROCEDURE — 82962 GLUCOSE BLOOD TEST: CPT

## 2017-04-05 PROCEDURE — 74011250637 HC RX REV CODE- 250/637: Performed by: ORTHOPAEDIC SURGERY

## 2017-04-05 PROCEDURE — 85018 HEMOGLOBIN: CPT | Performed by: ORTHOPAEDIC SURGERY

## 2017-04-05 PROCEDURE — 93971 EXTREMITY STUDY: CPT

## 2017-04-05 PROCEDURE — 36415 COLL VENOUS BLD VENIPUNCTURE: CPT | Performed by: ORTHOPAEDIC SURGERY

## 2017-04-05 RX ADMIN — CELECOXIB 200 MG: 200 CAPSULE ORAL at 17:29

## 2017-04-05 RX ADMIN — LISINOPRIL 20 MG: 20 TABLET ORAL at 08:15

## 2017-04-05 RX ADMIN — DOCUSATE SODIUM AND SENNOSIDES 1 TABLET: 8.6; 5 TABLET, FILM COATED ORAL at 08:15

## 2017-04-05 RX ADMIN — METFORMIN HYDROCHLORIDE: 500 TABLET, FILM COATED ORAL at 17:28

## 2017-04-05 RX ADMIN — METFORMIN HYDROCHLORIDE: 500 TABLET, FILM COATED ORAL at 08:14

## 2017-04-05 RX ADMIN — CELECOXIB 200 MG: 200 CAPSULE ORAL at 08:15

## 2017-04-05 RX ADMIN — Medication 10 ML: at 20:46

## 2017-04-05 RX ADMIN — LOVASTATIN 40 MG: 20 TABLET ORAL at 20:47

## 2017-04-05 RX ADMIN — HYDROCHLOROTHIAZIDE 25 MG: 25 TABLET ORAL at 08:16

## 2017-04-05 RX ADMIN — ACETAMINOPHEN 650 MG: 325 TABLET ORAL at 00:38

## 2017-04-05 RX ADMIN — FAMOTIDINE 20 MG: 20 TABLET, FILM COATED ORAL at 08:16

## 2017-04-05 RX ADMIN — Medication 5 ML: at 06:00

## 2017-04-05 RX ADMIN — DOCUSATE SODIUM AND SENNOSIDES 1 TABLET: 8.6; 5 TABLET, FILM COATED ORAL at 17:28

## 2017-04-05 RX ADMIN — FAMOTIDINE 20 MG: 20 TABLET, FILM COATED ORAL at 17:30

## 2017-04-05 RX ADMIN — POLYETHYLENE GLYCOL 3350 17 G: 17 POWDER, FOR SOLUTION ORAL at 08:16

## 2017-04-05 RX ADMIN — ASPIRIN 325 MG: 325 TABLET, DELAYED RELEASE ORAL at 18:00

## 2017-04-05 RX ADMIN — ACETAMINOPHEN 650 MG: 325 TABLET ORAL at 08:04

## 2017-04-05 RX ADMIN — ACETAMINOPHEN 650 MG: 325 TABLET ORAL at 14:17

## 2017-04-05 RX ADMIN — ACETAMINOPHEN 650 MG: 325 TABLET ORAL at 20:47

## 2017-04-05 RX ADMIN — ASPIRIN 325 MG: 325 TABLET, DELAYED RELEASE ORAL at 08:15

## 2017-04-05 NOTE — PROGRESS NOTES
Physical Therapy    Followed up w/ RN for 2nd session PT. RN reported pt w/ RLE DVT. Will hold afternoon PT.      Christa Means, PTA

## 2017-04-05 NOTE — PROGRESS NOTES
Problem: Mobility Impaired (Adult and Pediatric)  Goal: *Acute Goals and Plan of Care (Insert Text)  Physical Therapy Goals  Initiated 4/3/2017    1. Patient will move from supine to sit and sit to supine in bed with modified independence within 4 days. 2. Patient will perform sit to stand with supervision/set-up within 4 days. 3. Patient will ambulate with supervision/set-up for 150 feet with the least restrictive device within 4 days. 4. Patient will ascend/descend 4 stairs with use of handrail(s) with supervision/set-up within 4 days. 5. Patient will perform home exercise program per protocol with independence within 4 days. 6. Patient will demonstrate AROM 0-90 degrees in operative joint within 4 days. PHYSICAL THERAPY TREATMENT  Patient: Mecca Golden (69 y.o. female)  Date: 4/5/2017  Diagnosis: OSTEOARTHRITIS RIGHT KNEE  Osteoarthritis of right knee <principal problem not specified>  Procedure(s) (LRB):  RIGHT TOTAL KNEE ARTHROPLASTY  (Right) 2 Days Post-Op  Precautions: Fall, WBAT (Non English Speaking [place]) )  Chart, physical therapy assessment, plan of care and goals were reviewed. ASSESSMENT:  Pt received supine in bed sleeping,  present throughout session. Per  translation pt initially asking for therapy to see other pts before working w/her; explained to pt about plan to practice steps this session in preparation for possible d/c home later today. Pt required Min A at RLE to bring to EOB as well as for completion of sitting upright. Initial BP sitting 151/111 (, pt reported minor HA when asked via  translation), RN aware and informed PT pt BP reading prior to session w/in normal range. RN recommended cartwright in BP cuff (to larger adult), BP lissa'd again at 166/90. Pt remained clear mobility. Pt W/C transported to stairs, where she performed 8 steps (4x2) using both rails,CGA (cleared stairs demonstrating good safety and stability).  Pt required seated rest break (d/t pain), W/C transported out of therapy gym but encouraged to amb back to room (amb approx 25 FT, RW, CGA). Pt declined sitting up in chair and returned supine to bed (cotninue to encourage pt to be OOB and in chair for meals). Educated pt on use of gait belt for RLE support in/OOB. Noted pt R knee somewhat warmer than left (RN notified). Pt w/ ice packs donned to knee, RN present. At this point pt cleared stair training and PT as pt continues to demonstrate good safety and stability w/ gait and transfers w/RW. However,RN informed PT of possible doppler sometime today to rule out DVT. Progression toward goals:  [ ]      Improving appropriately and progressing toward goals  [X]      Improving slowly and progressing toward goals  [ ]      Not making progress toward goals and plan of care will be adjusted       PLAN:  Patient continues to benefit from skilled intervention to address the above impairments. Continue treatment per established plan of care. Discharge Recommendations:  Home Health  Further Equipment Recommendations for Discharge:  rolling walker       SUBJECTIVE:    continues to translate for pt, pt asking therapy to see other pts first.      OBJECTIVE DATA SUMMARY:   Critical Behavior:  Neurologic State: Alert, Appropriate for age  Orientation Level: Oriented X4  Cognition: Follows commands  Safety/Judgement: Awareness of environment  Range of Motion:   Active Extension: -23   Passive extension: -13                       Functional Mobility Training:  Bed Mobility:     Supine to Sit: Minimum assistance (Light Min A to complete upright sitting, RLE management)  Sit to Supine: Supervision (RLE gait belt assist into bed)                       Transfers:  Sit to Stand: Contact guard assistance (from elevated bed)  Stand to Sit: Stand-by asssistance                             Balance:  Sitting: Intact  Standing: Intact; With support  Ambulation/Gait Training:  Distance (ft): 25 Feet (ft)  Assistive Device: Gait belt;Walker, rolling  Ambulation - Level of Assistance: Contact guard assistance        Gait Abnormalities: Antalgic;Decreased step clearance; Step to gait  Right Side Weight Bearing: As tolerated     Base of Support: Narrowed  Stance: Right decreased  Speed/Kyleigh: Pace decreased (<100 feet/min); Slow  Step Length: Left shortened;Right shortened        Interventions: Visual/Demos                      Stairs:  Number of Stairs Trained: 8  Stairs - Level of Assistance: Contact guard assistance;Assist X2  Rail Use: Both  Therapeutic Exercises:     EXERCISE   Sets   Reps   Active Active Assist   Passive Self ROM   Comments   Ankle Pumps     [ ]                                [ ]                                [ ]                                [ ]                                    Quad Sets   10 [X]                                [ ]                                [ ]                                [ ]                                W/knee extension stretch   Hamstring Sets     [ ]                                [ ]                                [ ]                                [ ]                                    Short Arc Quads   8 [ ]                                [X]                                [ ]                                [ ]                                Supine, Minimal heel clearance off mattress   Knee Extension Stretch   20 sec hold   [ ]                                  [ ]                                  [ ]                                  [ ]                                    Sae Gacasandra     [ ]                                [ ]                                [ ]                                [ ]                                    Espinoza Garcia     [ ]                                [ ]                                [ ]                                [ ]                                    Devang Chester     [ ]                                [ ] [ ]                                [ ]                                    Tom Galdamez     [ ]                                [ ]                                [ ]                                [ ]                                          Pain:  Pain Scale 1: FACES  Pain Intensity 1: 3  Pain Location 1: Knee  Pain Orientation 1: Right  Pain Description 1: Aching  Pain Intervention(s) 1: Ice  Activity Tolerance:   Limited  Please refer to the flowsheet for vital signs taken during this treatment.   After treatment:   [ ] Patient left in no apparent distress sitting up in chair  [X] Patient left in no apparent distress in bed  [X] Call bell left within reach  [X] Nursing notified  [X] Caregiver present  [ ] Bed alarm activated      COMMUNICATION/COLLABORATION:   The patients plan of care was discussed with: Registered Nurse Ashia Epps PTA   Time Calculation: 49 mins

## 2017-04-05 NOTE — PROCEDURES
[address]  *** FINAL REPORT ***    Name: Artie Salmeron  MRN: [id number]    Inpatient  : 23 Mar 1950  HIS Order #: [id number]  [address] [facility] Visit #: [id number]  Date: 2017    TYPE OF TEST: Peripheral Venous Testing    REASON FOR TEST  Pain in limb, Limb swelling    Right Leg:-  Deep venous thrombosis:           Yes  Proximal extent of thrombus:      Popliteal Above The Knee  Superficial venous thrombosis:    No  Deep venous insufficiency:        Not examined  Superficial venous insufficiency: Not examined      INTERPRETATION/FINDINGS  PROCEDURE:  Color duplex ultrasound imaging of lower extremity veins. FINDINGS:       Right: Consistent with partial thrombosis involving the popliteal   and calf veins as demonstrated by vein non-compressibility, and by a  narrowing or occlusion of the flow channel on color Doppler imaging. The common femoral, deep femoral, femoral and great saphenous are  patent and without evidence of thrombus; each is is fully compressible   and there is no narrowing of the flow channel on color Doppler  imaging. Phasic flow is observed in the common femoral vein. Left:   The common femoral vein is patent and without evidence of   thrombus. Phasic flow is observed. This extremity was not otherwise   evaluated. IMPRESSION: There is evidence of vein thrombosis, as described above. The ultrasound appearance is more consistent with an acute than a  chronic process. ADDITIONAL COMMENTS    I have personally reviewed the data relevant to the interpretation of  this  study. TECHNOLOGIST: Pravin Davila.  Rebecca Bentley  Signed: 2017 11:59 AM    PHYSICIAN: Marylou Sacks., MD  Signed: 2017 07:35 AM

## 2017-04-05 NOTE — PROGRESS NOTES
Bedside and Verbal shift change report given to Zuleika Thao (oncoming nurse) by Chavez Pierre RN (offgoing nurse). Report given with Amber MCKEON and MAR.

## 2017-04-05 NOTE — PROGRESS NOTES
Bedside and Verbal shift change report given to Jeremiah Xiong RN (oncoming nurse) by Elio Saucedo RN(offgoing nurse). Report included the following information SBAR, Kardex, MAR and Recent Results.

## 2017-04-05 NOTE — PROGRESS NOTES
1045: Patient's right knee feels warm around the acquacell dressing on both sides; patient is slightly tachycardic; (see docflow sheet); 103, 111, etc. Patient states on the FACES scale her pain is between a 1-3; pulse ox readings are within normal limits; suspect possible DVT?; paged Dr. Eric Burton to order doppler; he gave a telephone verbal order; order placed; patient resting comfortably in bed; will continue to monitor    Results showed a partial rt.  DVT; called Dr. Eric Burton to inform; talked to his RN, Bruno eDlvalle; she verbalized understanding and stated she would give him the message; patient is resting comfortably supine in bed with no complaints of pain; knee area still feels warm to the touch

## 2017-04-06 LAB
GLUCOSE BLD STRIP.AUTO-MCNC: 139 MG/DL (ref 65–100)
GLUCOSE BLD STRIP.AUTO-MCNC: 155 MG/DL (ref 65–100)
GLUCOSE BLD STRIP.AUTO-MCNC: 164 MG/DL (ref 65–100)
GLUCOSE BLD STRIP.AUTO-MCNC: 175 MG/DL (ref 65–100)
SERVICE CMNT-IMP: ABNORMAL

## 2017-04-06 PROCEDURE — 74011250637 HC RX REV CODE- 250/637: Performed by: ORTHOPAEDIC SURGERY

## 2017-04-06 PROCEDURE — 74011250636 HC RX REV CODE- 250/636: Performed by: ORTHOPAEDIC SURGERY

## 2017-04-06 PROCEDURE — 82962 GLUCOSE BLOOD TEST: CPT

## 2017-04-06 PROCEDURE — 74011250637 HC RX REV CODE- 250/637: Performed by: INTERNAL MEDICINE

## 2017-04-06 PROCEDURE — 97110 THERAPEUTIC EXERCISES: CPT

## 2017-04-06 PROCEDURE — 74011250636 HC RX REV CODE- 250/636: Performed by: INTERNAL MEDICINE

## 2017-04-06 PROCEDURE — 97116 GAIT TRAINING THERAPY: CPT

## 2017-04-06 PROCEDURE — 65270000029 HC RM PRIVATE

## 2017-04-06 PROCEDURE — 77030027138 HC INCENT SPIROMETER -A

## 2017-04-06 PROCEDURE — 97530 THERAPEUTIC ACTIVITIES: CPT

## 2017-04-06 RX ORDER — ENOXAPARIN SODIUM 100 MG/ML
90 INJECTION SUBCUTANEOUS EVERY 12 HOURS
Status: DISCONTINUED | OUTPATIENT
Start: 2017-04-06 | End: 2017-04-06

## 2017-04-06 RX ORDER — ENOXAPARIN SODIUM 100 MG/ML
1 INJECTION SUBCUTANEOUS EVERY 12 HOURS
Status: DISCONTINUED | OUTPATIENT
Start: 2017-04-06 | End: 2017-04-07 | Stop reason: HOSPADM

## 2017-04-06 RX ORDER — OXYCODONE HYDROCHLORIDE 5 MG/1
5 TABLET ORAL
Qty: 60 TAB | Refills: 0 | Status: SHIPPED | OUTPATIENT
Start: 2017-04-06 | End: 2017-06-05

## 2017-04-06 RX ORDER — TRAMADOL HYDROCHLORIDE 50 MG/1
50 TABLET ORAL
Qty: 60 TAB | Refills: 0 | Status: SHIPPED | OUTPATIENT
Start: 2017-04-06

## 2017-04-06 RX ORDER — DICLOFENAC SODIUM 75 MG/1
75 TABLET, DELAYED RELEASE ORAL 2 TIMES DAILY
Qty: 60 TAB | Refills: 0 | Status: SHIPPED | OUTPATIENT
Start: 2017-04-06 | End: 2017-06-05

## 2017-04-06 RX ORDER — WARFARIN 7.5 MG/1
7.5 TABLET ORAL
Status: COMPLETED | OUTPATIENT
Start: 2017-04-06 | End: 2017-04-06

## 2017-04-06 RX ORDER — ENOXAPARIN SODIUM 100 MG/ML
80 INJECTION SUBCUTANEOUS EVERY 12 HOURS
Qty: 8 ML | Refills: 0 | Status: SHIPPED | OUTPATIENT
Start: 2017-04-06 | End: 2017-04-11

## 2017-04-06 RX ADMIN — METFORMIN HYDROCHLORIDE: 500 TABLET, FILM COATED ORAL at 17:10

## 2017-04-06 RX ADMIN — WARFARIN SODIUM 7.5 MG: 7.5 TABLET ORAL at 17:10

## 2017-04-06 RX ADMIN — HYDROCHLOROTHIAZIDE 25 MG: 25 TABLET ORAL at 08:50

## 2017-04-06 RX ADMIN — ENOXAPARIN SODIUM 90 MG: 100 INJECTION SUBCUTANEOUS at 08:51

## 2017-04-06 RX ADMIN — POLYETHYLENE GLYCOL 3350 17 G: 17 POWDER, FOR SOLUTION ORAL at 08:50

## 2017-04-06 RX ADMIN — FAMOTIDINE 20 MG: 20 TABLET, FILM COATED ORAL at 17:10

## 2017-04-06 RX ADMIN — LOVASTATIN 40 MG: 20 TABLET ORAL at 20:53

## 2017-04-06 RX ADMIN — ACETAMINOPHEN 650 MG: 325 TABLET ORAL at 19:02

## 2017-04-06 RX ADMIN — FAMOTIDINE 20 MG: 20 TABLET, FILM COATED ORAL at 08:50

## 2017-04-06 RX ADMIN — DOCUSATE SODIUM AND SENNOSIDES 1 TABLET: 8.6; 5 TABLET, FILM COATED ORAL at 08:50

## 2017-04-06 RX ADMIN — Medication 6 ML: at 14:19

## 2017-04-06 RX ADMIN — CELECOXIB 200 MG: 200 CAPSULE ORAL at 17:09

## 2017-04-06 RX ADMIN — Medication 10 ML: at 20:52

## 2017-04-06 RX ADMIN — Medication 10 ML: at 08:18

## 2017-04-06 RX ADMIN — DOCUSATE SODIUM AND SENNOSIDES 1 TABLET: 8.6; 5 TABLET, FILM COATED ORAL at 17:09

## 2017-04-06 RX ADMIN — ACETAMINOPHEN 650 MG: 325 TABLET ORAL at 02:40

## 2017-04-06 RX ADMIN — METFORMIN HYDROCHLORIDE: 500 TABLET, FILM COATED ORAL at 08:17

## 2017-04-06 RX ADMIN — ACETAMINOPHEN 650 MG: 325 TABLET ORAL at 13:01

## 2017-04-06 RX ADMIN — CELECOXIB 200 MG: 200 CAPSULE ORAL at 08:50

## 2017-04-06 RX ADMIN — LISINOPRIL 20 MG: 20 TABLET ORAL at 08:50

## 2017-04-06 RX ADMIN — ENOXAPARIN SODIUM 100 MG: 100 INJECTION SUBCUTANEOUS at 20:53

## 2017-04-06 RX ADMIN — ACETAMINOPHEN 650 MG: 325 TABLET ORAL at 08:17

## 2017-04-06 NOTE — PROGRESS NOTES
Lovenox Daily Monitoring  Indication: DVT  Recent Labs      04/05/17   0445  04/04/17   0503   HGB  8.6*  8.2*   CREA   --   0.98     Date of last CBC: 4/5  Current Weight: 93.5 kg  Est. CrCl = 62.6 ml/min  Current Dose: 80 mg subcutaneously every 12 hours.   Plan: Change to 90 mg subcutaneously every 12 hours (~1mg/kg)

## 2017-04-06 NOTE — PROGRESS NOTES
Bedside shift change report given to Melecio Lee RN (oncoming nurse) by MARIN Gabriel RN (offgoing nurse). Report included the following information SBAR.

## 2017-04-06 NOTE — PROGRESS NOTES
Problem: Mobility Impaired (Adult and Pediatric)  Goal: *Acute Goals and Plan of Care (Insert Text)  Physical Therapy Goals  Initiated 4/3/2017    1. Patient will move from supine to sit and sit to supine in bed with modified independence within 4 days. 2. Patient will perform sit to stand with supervision/set-up within 4 days. 3. Patient will ambulate with supervision/set-up for 150 feet with the least restrictive device within 4 days. 4. Patient will ascend/descend 4 stairs with use of handrail(s) with supervision/set-up within 4 days. 5. Patient will perform home exercise program per protocol with independence within 4 days. 6. Patient will demonstrate AROM 0-90 degrees in operative joint within 4 days. PHYSICAL THERAPY TREATMENT  Patient: Mecca Calhoun (69 y.o. female)  Date: 4/6/2017  Diagnosis: OSTEOARTHRITIS RIGHT KNEE  Osteoarthritis of right knee <principal problem not specified>  Procedure(s) (LRB):  RIGHT TOTAL KNEE ARTHROPLASTY  (Right) 3 Days Post-Op  Precautions: Fall, WBAT (Non English Speaking [place]) )  Chart, physical therapy assessment, plan of care and goals were reviewed. ASSESSMENT:  Reviewed chart, RN cleared pt for mobility. Pt received sleeping in bed (supine),  asleep also but awakened at PT entry. Blue phone used to assist w/ communication between pt and pt  ( per  and ,pt reported she cannot find her hearing aid (right ear) and unable to use blue phone at right ear). Pt agreeable to exercises and attempt to gait train;performed supine and seated EOB exercises. Pt w/ elevated BP upon sitting up in bed (187/93 ) RN aware, cleared pt for PT). Pt reported dizziness w/ sit>stand x1(RW, CGA) (/92 ); pt reported dizziness improved, sit>stand x2 ([address] Pt gait trained approx 25FT, reported increase in [address] through RLE w/ continued mobility; returned sitting in chair at bedside for bath w/ PCT.  Pt denied dizziness/lightheadedness and SOB throughout gait and at sessions end (informed pt and pt  to call for assist if any sx's should occur). RN aware of VS and pt sitting in chair w/  present. Progression toward goals:  [X]      Improving appropriately and progressing toward goals  [ ]      Improving slowly and progressing toward goals  [ ]      Not making progress toward goals and plan of care will be adjusted       PLAN:  Patient continues to benefit from skilled intervention to address the above impairments. Continue treatment per established plan of care. Discharge Recommendations:  Home Health  Further Equipment Recommendations for Discharge:  rolling walker (donated to pt by CMS)       SUBJECTIVE:   (Using blue phone) Patient's  translating for pt, pt expressed concern about RLE DVT (parital) that was found. Informed pt she was cleared by RN to work with. OBJECTIVE DATA SUMMARY:   Critical Behavior:  Neurologic State: Alert  Orientation Level: Oriented X4  Cognition: Follows commands  Safety/Judgement: Awareness of environment  Range of Motion:      Active knee flexion: 80                    Functional Mobility Training:  Bed Mobility:     Supine to Sit: Minimum assistance; Additional time (RLE)  Sit to Supine:  (pt returned chair)                       Transfers:  Sit to Stand: Contact guard assistance  Stand to Sit: Contact guard assistance                             Balance:  Sitting: Intact  Standing: Intact; With support  Ambulation/Gait Training:  Distance (ft): 25 Feet (ft) (x2)  Assistive Device: Gait belt;Walker, rolling  Ambulation - Level of Assistance: Contact guard assistance        Gait Abnormalities: Antalgic;Decreased step clearance; Step to gait  Right Side Weight Bearing: As tolerated     Base of Support: Narrowed  Stance: Right decreased  Speed/Kyleigh: Pace decreased (<100 feet/min); Slow  Step Length: Left shortened;Right shortened                               Stairs:            Therapeutic Exercises:     EXERCISE   Sets   Reps   Active Active Assist   Passive Self ROM   Comments   Ankle Pumps     [ ]                                [ ]                                [ ]                                [ ]                                    Quad Sets   8 [ ]                                [ ]                                [ ]                                [ ]                                Moderate tactile cuing for technique   Hamstring Sets     [ ]                                [ ]                                [ ]                                [ ]                                    Oscar Parra     [ ]                                [ ]                                [ ]                                [ ]                                    Sarthak Messina       [ ]                                  [ ]                                  [ ]                                  [ ]                                    Gareth Rodas   8 [ ]                                [ ]                                [X]                                [ ]                                    Rachel Fraser   8 [ ]                                [ ]                                [X]                                [ ]                                    Mustapha Monteiro     [ ]                                [ ]                                [ ]                                [ ]                                    Telma Abts     [ ]                                [ ]                                [ ]                                [ ]                                          Pain:                    Activity Tolerance:   Fair  Please refer to the flowsheet for vital signs taken during this treatment.   After treatment:   [X] Patient left in no apparent distress sitting up in chair  [ ] Patient left in no apparent distress in bed  [X] Call bell left within reach  [X] Nursing notified  [ ] Caregiver present  [ ] Bed alarm activated      COMMUNICATION/COLLABORATION:   The patients plan of care was discussed with: Registered Nurse Ivan Epps,PTA   Time Calculation: 42 mins

## 2017-04-06 NOTE — PROGRESS NOTES
No new complaints overnight. Denies chest pain or shortness of breath. Tolerating diet well      GEN:  NAD.  AOx3   ABD:  S/NT/ND   RLE:  Dressing C/D/I , 5/5 motor, Sensation rossly intact to light touch throughout, 1+ dp/pt pulses, foot perfused, calf swollen and tender    Patient Vitals for the past 24 hrs:   Temp Pulse Resp BP SpO2   04/06/17 0238 97.8 °F (36.6 °C) 96 14 135/78 100 %   04/05/17 2036 98.4 °F (36.9 °C) 94 16 133/77 96 %   04/05/17 1456 98 °F (36.7 °C) 100 18 166/86 99 %   04/05/17 0900 - (!) 111 - (!) 151/111 -       Current Facility-Administered Medications   Medication Dose Route Frequency    enoxaparin (LOVENOX) injection 80 mg  80 mg SubCUTAneous Q12H    lovastatin (MEVACOR) tablet 40 mg  40 mg Oral QHS    metFORMIN (GLUCOPHAGE) 500 mg, SITagliptin (JANUVIA) 50 mg   Oral BID WITH MEALS    sodium chloride 0.9 % bolus infusion 500 mL  500 mL IntraVENous ONCE PRN    sodium chloride (NS) flush 5-10 mL  5-10 mL IntraVENous Q8H    sodium chloride (NS) flush 5-10 mL  5-10 mL IntraVENous PRN    acetaminophen (TYLENOL) tablet 650 mg  650 mg Oral Q6H    oxyCODONE IR (ROXICODONE) tablet 5 mg  5 mg Oral Q3H PRN    celecoxib (CELEBREX) capsule 200 mg  200 mg Oral BID    naloxone (NARCAN) injection 0.4 mg  0.4 mg IntraVENous PRN    hydrOXYzine HCl (ATARAX) tablet 10 mg  10 mg Oral Q8H PRN    famotidine (PEPCID) tablet 20 mg  20 mg Oral BID    senna-docusate (PERICOLACE) 8.6-50 mg per tablet 1 Tab  1 Tab Oral BID    polyethylene glycol (MIRALAX) packet 17 g  17 g Oral DAILY    bisacodyl (DULCOLAX) suppository 10 mg  10 mg Rectal DAILY PRN    aspirin delayed-release tablet 325 mg  325 mg Oral BID    lisinopril (PRINIVIL, ZESTRIL) tablet 20 mg  20 mg Oral DAILY    hydroCHLOROthiazide (HYDRODIURIL) tablet 25 mg  25 mg Oral DAILY       Lab Results   Component Value Date/Time    HGB 8.6 04/05/2017 04:45 AM    INR 0.9 03/29/2017 02:03 PM       Lab Results   Component Value Date/Time    Sodium 140 04/04/2017 05:03 AM    Potassium 3.8 04/04/2017 05:03 AM    Chloride 107 04/04/2017 05:03 AM    CO2 24 04/04/2017 05:03 AM    BUN 16 04/04/2017 05:03 AM    Creatinine 0.98 04/04/2017 05:03 AM    Calcium 7.8 04/04/2017 05:03 AM    Magnesium 1.8 06/19/2016 05:18 AM            78 y.o. female s/p right total knee arthroplasty on 4/3/2017  . Patient with right leg blood clot. Treatment lovenox being given- hospitalist consult ordered.        DVT Prophylaxis: Lovenox 80 BID currently  Weight Bearing: WBAT RLE   Pain Control: Celebrex, toradol (if Cr normal), Tylenol, scheduled tramadol, oxy 5 mg for breakthrough, dilaudid IV 0.5 mg for secondary breakthrough  Disposition: Home, PT  - likely tomorrow

## 2017-04-06 NOTE — PROGRESS NOTES
Pharmacist Note  Warfarin Dosing  Consult provided for this 78 y. o.female to manage warfarin for Venous Thrombosis    INR Goal: 2 - 3    Home regimen/ tablet size: n/a    Drugs that may increase INR: None  Drugs that may decrease INR: None  Other current anticoagulants/ drugs that may increase bleeding risk: Enoxaparin and NSAID  Risk factors: Age > 65  Daily INR ordered: YES    Recent Labs      04/05/17   0445  04/04/17   0503   HGB  8.6*  8.2*     Date               INR                  Dose  3/29  0.9  --  4/06                  --                   7.5 mg                                                                               Assessment/ Plan: Will order warfarin 7.5 mg PO x 1 dose. Pharmacy will continue to monitor daily and adjust therapy as indicated. Please contact the pharmacist at x [phone] or  for outpatient recommendations if needed.

## 2017-04-06 NOTE — DISCHARGE INSTRUCTIONS
After Hospital Care Plan:  Discharge Instructions Knee Replacement  Dr. Eric Burton    Patient Name: Edmund Driver  Date of procedure: 4/3/2017   Procedure: Procedure(s):  RIGHT TOTAL KNEE ARTHROPLASTY   Surgeon: Coni Cates) and Role:     * Susan Jenkins MD - Primary  PCP: Mert Colón MD  Date of discharge: No discharge date for patient encounter. Follow up appointments  -follow up with Dr. Eric Burton in 3 weeks. Call [phone] to make an appointment. [facility] Agency: ________________________ phone: _____________________  The agency will contact you to arrange dates/times for visits. Please call them if you do not hear from them within 24 hours after you are discharged    When to call your Orthopaedic Surgeon:  -unrelieved pain  -Signs of infection-if your incision is red; continues to have drainage; drainage has a foul odor or if you have a persistent fever over 101 degrees      When to call your Primary Care Physician:  -Concerns about medical conditions such as diabetes, high blood pressure, asthma, congestive heart failure  -Call if blood sugars are elevated, persistent headache or dizziness, coughing or congestion, constipation or diarrhea, burning with urination, abnormal heart rate    When to call [id number] go to the nearest emergency room  -acute onset of chest pain, shortness of breath, difficulty breathing    Activity  -walk with your walker or crutches putting as much weight on your leg as you can tolerate. Refer to pages 23-31 of your handbook for instructions and pictures  -do 20 repetitions of each exercise 3 times each day as instructed by the physical therapist.  Refer to pages 32-40 of your handbook for exercise instructions and pictures  -get up every one hour and walk (except at night when sleeping)  -do not drive or operate heavy machinery    Incision Care  -the Aquacel (brown, waterproof) surgical dressing is to remain on your knee for 7 days.  On the 7th day have someone gently peel the dressing off by carefully lifting the edge and stretching it slightly to break the adhesive seal and leave incision open to air. You may take a shower with the Aquacel dressing in place. Preventing blood clots   -You have a diagnosed blood clot. You will be on treatment for this for the next 3-6 months per the hospitalist recommendation. Pain management  - Please take scheduled 650 mg tylenol every 6 hours for 4 weeks  - Please take scheduled diclofenac 75 mg twice daily for 4 weeks  - Please take tramadol every 6 hours for pain as needed for pain. - For breakthrough please take 5-10 mg oxycodone     - Avoid alcoholic beverages while taking pain medication  - Do not take any over-the-counter medication for pain except Tylenol (acetaminophen)  - Please be aware that many medications contain Tylenol. We do not want you to over medicate so please read the information below as a guide. Do not take more than 4 Grams of Tylenol in a 24 hour  - You may place an ice bag on your knee for 15-20 minutes after exercising and as needed throughout the day and night      Diet  -resume usual diet; drink plenty of fluids; eat foods high in fiber  -you may want to take a stool softener (such as Senokot-S or Colace) to prevent constipation while you are taking pain medication.   If constipation occurs, take a laxative (such as Dulcolax tablets, Milk of Magnesia, or a suppository)    [facility] Protocol (to be followed by [address])    Nursing-per physicians order  -remove Aquacel dressing at 7 days post-op   -complete head to toe assessment, vital signs  -medication reconciliation  -review pain management  -manage chronic medical conditions    Physical Therapy-per physicians order    Weight bearing status:  Precautions at Admission: Fall, WBAT (Non English Speaking ([place]) )     Right Side Weight Bearing: As tolerated    Mobility Status:  Supine to Sit: Minimum assistance (Light Min A to complete upright sitting, RLE management)  Sit to Stand: Contact guard assistance (from elevated bed)  Sit to Supine: Supervision (RLE gait belt assist into bed)  Bed to Chair: Supervision    Gait:  Distance (ft): 25 Feet (ft)  Ambulation - Level of Assistance: Contact guard assistance  Assistive Device: Gait belt, Walker, rolling  Gait Abnormalities: Antalgic, Decreased step clearance, Step to gait    ADL status overall composite:            Toilet Transfer : Supervision, Adaptive equipment       Physical Therapy  -assessment and evaluation-bed mobility; functional transfers (bed, chair, bathroom, stairs); ambulation with equipment, car transfers, safety and ability to get out of house in the event of an emergency  -review and maintain weight bearing as tolerated  -discuss pain management  -review how to do ADLs    -Home Exercise Program-refer to pages 32-40 of the patient handbook for exercises  -supine exercises-ankle pumps, hamstring sets, quad sets, heel slides, short arc quad sets, long arc quads-prop heel on pillow for stretch, straight leg raise  -sitting exercises-active knee flexion, passive knee flexion, active knee extension, ankle pumps, bicep curls (use weights as appropriate), triceps pushups, shoulder flexion (use weights as appropriate)  -standing exercises holding onto supportive counter-toe raises, heel raises, mini-squats, heel/toe touches with knee bend, marching in place, hamstring curls    Physical therapy goals by discharge from [facility] [address] ambulation with walker, crutches or cane if needed (level surfaces and   stairs)  -Flexion > 90 degrees, extension 0 degrees  -Daily performance of home exercise program  -Independent with stair climbing and car transfers  -Progress to community ambulator (least restrictive assistive device)

## 2017-04-06 NOTE — PROGRESS NOTES
Problem: Mobility Impaired (Adult and Pediatric)  Goal: *Acute Goals and Plan of Care (Insert Text)  Physical Therapy Goals  Initiated 4/3/2017    1. Patient will move from supine to sit and sit to supine in bed with modified independence within 4 days. 2. Patient will perform sit to stand with supervision/set-up within 4 days. 3. Patient will ambulate with supervision/set-up for 150 feet with the least restrictive device within 4 days. 4. Patient will ascend/descend 4 stairs with use of handrail(s) with supervision/set-up within 4 days. 5. Patient will perform home exercise program per protocol with independence within 4 days. 6. Patient will demonstrate AROM 0-90 degrees in operative joint within 4 days. PHYSICAL THERAPY TREATMENT  Patient: Mecca Jackson (69 y.o. female)  Date: 4/6/2017  Diagnosis: OSTEOARTHRITIS RIGHT KNEE  Osteoarthritis of right knee <principal problem not specified>  Procedure(s) (LRB):  RIGHT TOTAL KNEE ARTHROPLASTY  (Right) 3 Days Post-Op  Precautions: Fall, WBAT (Non English Speaking [place]) )  Chart, physical therapy assessment, plan of care and goals were reviewed. ASSESSMENT:  Pt received in long sitting w/ left leg OOB; pt agreeable to PT. Per pt , pt is ready to get out of bed and walk. Pt denied dizziness/lightheadedness w/transfers this session (also denies SOB, chest pain, VSS). Pt continues to gradually improve gait distance, amb 50 FT (x2) w/ RW ,CGA. Pt returned supine to bed, per , reports tightness in RLE (Noted RLE remains warm to touch compared to LLE, RN aware). Pt BP post-activity 152/86 104HR. Pt remained supine in bed, ice packs refilled and donned to R knee. PCT present at this time.    Progression toward goals:  [X]      Improving appropriately and progressing toward goals  [ ]      Improving slowly and progressing toward goals  [ ]      Not making progress toward goals and plan of care will be adjusted       PLAN:  Patient continues to benefit from skilled intervention to address the above impairments. Continue treatment per established plan of care. Discharge Recommendations:  Home Health  Further Equipment Recommendations for Discharge:  rolling walker (has RW donated from [facility])       SUBJECTIVE:   Per pt , pt ready to get out of bed and walk. OBJECTIVE DATA SUMMARY:   Range of Motion:                          Functional Mobility Training:  Bed Mobility:     Supine to Sit: Minimum assistance; Additional time (RLE)  Sit to Supine:  (pt returned chair)                       Transfers:  Sit to Stand: Contact guard assistance  Stand to Sit: Contact guard assistance                             Ambulation/Gait Training:  Distance (ft): 50 Feet (ft) (x2)  Assistive Device: Gait belt;Walker, rolling  Ambulation - Level of Assistance: Contact guard assistance        Gait Abnormalities: Antalgic;Decreased step clearance; Step to gait  Right Side Weight Bearing: As tolerated     Base of Support: Narrowed  Stance: Right decreased  Speed/Kyleigh: Pace decreased (<100 feet/min); Slow  Step Length: Left shortened;Right shortened                    Stairs: Therapeutic Exercises:   Exercises performed per protocol. See morning treatment note for description. Pain:                    Activity Tolerance:   Good  Please refer to the flowsheet for vital signs taken during this treatment.   After treatment:   [ ] Patient left in no apparent distress sitting up in chair  [X] Patient left in no apparent distress in bed  [X] Call bell left within reach  [X] Nursing notified  [X] Caregiver present (pt  present)  [ ] Bed alarm activated      COMMUNICATION/COLLABORATION:   The patients plan of care was discussed with: Registered Nurse Marily Epps PTA   Time Calculation: 30 mins

## 2017-04-06 NOTE — CDMP QUERY
Dear Dr. Bob Reynolds,    Noted a diagnosis of \"right leg blood clot\" in the progress notes. Can this be further specified as:    => Acute thrombosis of RLE in the setting of right leg blood clot requiring diagnostic testing, anticoagulant and Hospitalist consult  =>Other Explanation of clinical findings  =>Unable to Determine (no explanation of clinical findings)    The medical record reflects the following clinical findings, treatment, and risk factors:    Risk Factors: 66yo hx CVA, obesity  Clinical Indicators: s/p right knee arthroplasty, per PN \"right leg blood clot\"  Treatment: doppler, lovenox and Hospitalist consult    Please clarify and document your clinical opinion in the progress notes and discharge summary including the definitive and/or presumptive diagnosis, (suspected or probable), related to the above clinical findings. Please include clinical findings supporting your diagnosis.     Thank You  Boubacar Rothman,MSN,BSN,RN,[facility]  [phone]

## 2017-04-06 NOTE — PROGRESS NOTES
Bedside shift change report given to Getachew Valenzuela RN (oncoming nurse) by Clara Gomez RN (offgoing nurse). Report given with SBAR, Kardex, Intake/Output and MAR.

## 2017-04-06 NOTE — CONSULTS
History & Physical    Primary Care Provider: Sofia Cameron MD  Source of Information: Patient,     History of Presenting Illness:   Mecca Mortensen is a 78 y.o. female admitted for right total knee replacement. On post op day #2, patient noted with pain and tightness in the right leg. Duplex obtained and positive for acute thrombus of the RLE. Patient started on therapeutic lovenox and consult obtained for additional recommendations and assistance. Review of Systems:  Comprehensive review obtained including ENT, cardiovascular, respiratory, GI, , musculoskeletal, neuro, psych, endocrine, and skin and all negative other than tightness noted in the RLE. Patient feels the need to stretch the leg. Past Medical History:   Diagnosis Date    Arthritis     Diabetes ([address]     Hypertension       History reviewed. No pertinent surgical history. Prior to Admission medications    Medication Sig Start Date End Date Taking? Authorizing Provider   enoxaparin (LOVENOX) 80 mg/0.8 mL injection 80 mg by SubCUTAneous route every twelve (12) hours every twelve (12) hours for 5 days. Indications: DEEP VENOUS THROMBOSIS 4/6/17 4/11/17 Yes Lavern Cristobal MD   oxyCODONE IR (ROXICODONE) 5 mg immediate release tablet Take 1 Tab by mouth every three (3) hours as needed. Max Daily Amount: 40 mg. 4/6/17  Yes Lavern Cristobal MD   traMADol (ULTRAM) 50 mg tablet Take 1 Tab by mouth every six (6) hours as needed for Pain. Max Daily Amount: 200 mg. 4/6/17  Yes Lavern Cristobal MD   diclofenac EC (VOLTAREN) 75 mg EC tablet Take 1 Tab by mouth two (2) times a day. 4/6/17  Yes Lavern Cristobal MD   lisinopril-hydrochlorothiazide (PRINZIDE, ZESTORETIC) 20-25 mg per tablet Take 1 Tab by mouth daily. Yes Idalia Byrd MD   lovastatin (MEVACOR) 40 mg tablet Take 40 mg by mouth daily.     Historical Provider   sitaGLIPtin-metFORMIN (JANUMET)  mg per tablet Take 1 Tab by mouth two (2) times daily (with meals). Historical Provider   naproxen (NAPROSYN) 375 mg tablet Take 375 mg by mouth two (2) times daily (with meals). Phys Other, MD     No Known Allergies   Family History   Problem Relation Age of Onset    Anesth Problems Neg Hx         SOCIAL HISTORY:  Patient resides:  Independently x   Assisted Living    SNF    With family care       Smoking history:   None x   Former    Chronic      Alcohol history:   None x   Social    Chronic        Objective:     Physical Exam:     Visit Vitals    /72    Pulse 90    Temp 98.9 °F (37.2 °C)    Resp 15    Ht 5' 4\" (1.626 m)    Wt 96.2 kg (212 lb 1.3 oz)    SpO2 100%    BMI 36.4 kg/m2    O2 Flow Rate (L/min): 1 l/min O2 Device: Room air    General:  Alert, cooperative, no distress, appears stated age. Head:  Normocephalic, without obvious abnormality, atraumatic. Eyes:  Conjunctivae/corneas clear. EOMs intact. Nose: Nares normal. Septum midline. Throat: Lips, mucosa, and tongue normal.    Lungs:   Clear to auscultation bilaterally. No w/r/r or acute respiratory distress   Heart:  Regular rate and rhythm, S1, S2 normal.   Abdomen:   Soft, non-tender. Bowel sounds normal. No masses,  No organomegaly. Extremities: Post right knee replacement--bandaged. RLE with non-pitting edema. Skin: Skin color, texture, turgor normal. No rashes. Neurologic: Awake/alert. Able to move all extremities. Data Review:     Recent Days:  Recent Labs      04/05/17   0445  04/04/17   0503   HGB  8.6*  8.2*     Recent Labs      04/04/17   0503   NA  140   K  3.8   CL  107   CO2  24   GLU  168*   BUN  16   CREA  0.98   CA  7.8*     No results for input(s): PH, PCO2, PO2, HCO3, FIO2 in the last 72 hours.     24 Hour Results:  Recent Results (from the past 24 hour(s))   GLUCOSE, POC    Collection Time: 04/05/17  4:17 PM   Result Value Ref Range    Glucose (POC) 210 (H) 65 - 100 mg/dL    Performed by Belkis Alva, POC    Collection Time: 04/05/17  9:15 PM   Result Value Ref Range    Glucose (POC) 190 (H) 65 - 100 mg/dL    Performed by RAFAEL DARBY    GLUCOSE, POC    Collection Time: 04/06/17  6:29 AM   Result Value Ref Range    Glucose (POC) 175 (H) 65 - 100 mg/dL    Performed by Callie 88, POC    Collection Time: 04/06/17 11:27 AM   Result Value Ref Range    Glucose (POC) 155 (H) 65 - 100 mg/dL    Performed by Marin Delgadillo          Imaging:   Duplex US of BLE:  Positive for acute partial thrombosis involving the popliteal  and calf veins as demonstrated by vein non-compressibility, and by a  narrowing or occlusion of the flow channel on color Doppler imaging. Assessment:     Active Problems:    Osteoarthritis of right knee (4/3/2017)           Plan:     77 y/o female with:  1. Right lower extremity DVT  --Positive involvement of the popliteal and calf veins  --Likely resulting from recent trauma/knee surgery   --Started on therapeutic lovenox per primary physician-->dose changed to more appropriate weight based dose   --Recommend patient start on coumadin due to lack of insurance  --Will place pharmacy consult for coumadin dosing to start tonight  --Will need 7 day lovenox bridge of 100 mg SQ BID while starting on coumadin-->may need additional days of lovenox if INR does not increase appropriately   --Prior to discharge, recommend patient has follow up with her PCP scheduled for coumadin monitoring/INR check. --OK to discharge once lovenox, coumadin, and follow up with INR are all obtained.           Signed By: Chevy Gonzales DO     April 6, 2017

## 2017-04-07 VITALS
RESPIRATION RATE: 16 BRPM | WEIGHT: 212.08 LBS | BODY MASS INDEX: 36.21 KG/M2 | DIASTOLIC BLOOD PRESSURE: 81 MMHG | OXYGEN SATURATION: 100 % | SYSTOLIC BLOOD PRESSURE: 156 MMHG | HEART RATE: 95 BPM | HEIGHT: 64 IN | TEMPERATURE: 97.9 F

## 2017-04-07 LAB
GLUCOSE BLD STRIP.AUTO-MCNC: 141 MG/DL (ref 65–100)
GLUCOSE BLD STRIP.AUTO-MCNC: 148 MG/DL (ref 65–100)
GLUCOSE BLD STRIP.AUTO-MCNC: 162 MG/DL (ref 65–100)
INR BLD: 1.6 (ref 0.9–1.2)
SERVICE CMNT-IMP: ABNORMAL

## 2017-04-07 PROCEDURE — 82962 GLUCOSE BLOOD TEST: CPT

## 2017-04-07 PROCEDURE — 74011250637 HC RX REV CODE- 250/637: Performed by: ORTHOPAEDIC SURGERY

## 2017-04-07 PROCEDURE — 74011250636 HC RX REV CODE- 250/636: Performed by: INTERNAL MEDICINE

## 2017-04-07 PROCEDURE — 85610 PROTHROMBIN TIME: CPT

## 2017-04-07 PROCEDURE — 74011250637 HC RX REV CODE- 250/637: Performed by: INTERNAL MEDICINE

## 2017-04-07 PROCEDURE — 97530 THERAPEUTIC ACTIVITIES: CPT

## 2017-04-07 PROCEDURE — 97116 GAIT TRAINING THERAPY: CPT

## 2017-04-07 RX ORDER — WARFARIN 2.5 MG/1
2.5 TABLET ORAL ONCE
Status: COMPLETED | OUTPATIENT
Start: 2017-04-07 | End: 2017-04-07

## 2017-04-07 RX ORDER — WARFARIN SODIUM 5 MG/1
5 TABLET ORAL DAILY
Qty: 90 TAB | Refills: 0 | Status: SHIPPED | OUTPATIENT
Start: 2017-04-07 | End: 2017-06-05 | Stop reason: DRUGHIGH

## 2017-04-07 RX ADMIN — CELECOXIB 200 MG: 200 CAPSULE ORAL at 08:31

## 2017-04-07 RX ADMIN — POLYETHYLENE GLYCOL 3350 17 G: 17 POWDER, FOR SOLUTION ORAL at 08:31

## 2017-04-07 RX ADMIN — HYDROCHLOROTHIAZIDE 25 MG: 25 TABLET ORAL at 08:31

## 2017-04-07 RX ADMIN — WARFARIN SODIUM 2.5 MG: 2.5 TABLET ORAL at 13:04

## 2017-04-07 RX ADMIN — DOCUSATE SODIUM AND SENNOSIDES 1 TABLET: 8.6; 5 TABLET, FILM COATED ORAL at 08:31

## 2017-04-07 RX ADMIN — ENOXAPARIN SODIUM 100 MG: 100 INJECTION SUBCUTANEOUS at 08:31

## 2017-04-07 RX ADMIN — ACETAMINOPHEN 650 MG: 325 TABLET ORAL at 13:04

## 2017-04-07 RX ADMIN — LISINOPRIL 20 MG: 20 TABLET ORAL at 08:31

## 2017-04-07 RX ADMIN — ACETAMINOPHEN 650 MG: 325 TABLET ORAL at 02:00

## 2017-04-07 RX ADMIN — FAMOTIDINE 20 MG: 20 TABLET, FILM COATED ORAL at 08:31

## 2017-04-07 RX ADMIN — ACETAMINOPHEN 650 MG: 325 TABLET ORAL at 08:31

## 2017-04-07 RX ADMIN — METFORMIN HYDROCHLORIDE: 500 TABLET, FILM COATED ORAL at 08:31

## 2017-04-07 NOTE — PROGRESS NOTES
Rounded on patient. Patient is hard of hearing and  was at bedside.  phone is hard to use with patient because of the hard of hearing, but with the help of her  she expressed understanding.  stated that he's a [place] and showed me pictures. His wife and himself came to the [place] for healthcare and plan to go back as soon as they can. No further needs at this time.

## 2017-04-07 NOTE — PROGRESS NOTES
Pharmacist Note  Warfarin Dosing  Consult provided for this 78 y. o.female to manage warfarin for Venous Thrombosis    INR Goal: 2 - 3    Home regimen/ tablet size: n/a    Drugs that may increase INR: None  Drugs that may decrease INR: None  Other current anticoagulants/ drugs that may increase bleeding risk: Enoxaparin and NSAID  Risk factors: Age > 65  Daily INR ordered: YES    Recent Labs      04/07/17   1007  04/05/17   0445   HGB   --   8.6*   INR  1.6*   --      Date               INR                  Dose  3/29  0.9  --  4/06                  --                   7.5 mg  4/07                1.6                   2.5 mg                                                                               Assessment/ Plan: Will order warfarin 2.5 mg PO x 1 dose. Pharmacy will continue to monitor daily and adjust therapy as indicated. Please contact the pharmacist at x [phone] or  for outpatient recommendations if needed.

## 2017-04-07 NOTE — PROGRESS NOTES
Bedside and Verbal shift change report given to Dot (oncoming nurse) by Kamaljit Carrillo RN (offgoing nurse). Report given with SBAR, Amber and MAR.

## 2017-04-07 NOTE — PROGRESS NOTES
Problem: Mobility Impaired (Adult and Pediatric)  Goal: *Acute Goals and Plan of Care (Insert Text)  Physical Therapy Goals  Initiated 4/3/2017    1. Patient will move from supine to sit and sit to supine in bed with modified independence within 4 days. 2. Patient will perform sit to stand with supervision/set-up within 4 days. 3. Patient will ambulate with supervision/set-up for 150 feet with the least restrictive device within 4 days. 4. Patient will ascend/descend 4 stairs with use of handrail(s) with supervision/set-up within 4 days. 5. Patient will perform home exercise program per protocol with independence within 4 days. 6. Patient will demonstrate AROM 0-90 degrees in operative joint within 4 days. PHYSICAL THERAPY TREATMENT  Patient: Mecca Young (69 y.o. female)  Date: 4/7/2017  Diagnosis: OSTEOARTHRITIS RIGHT KNEE  Osteoarthritis of right knee <principal problem not specified>  Procedure(s) (LRB):  RIGHT TOTAL KNEE ARTHROPLASTY  (Right) 4 Days Post-Op  Precautions: Fall, WBAT (Non English Speaking [place]) )  Chart, physical therapy assessment, plan of care and goals were reviewed. ASSESSMENT:  Pt received supine in bed sleeping; agreeable to PT when easily awakened. Performed seated EOB exercises (sese below). Pt continues to report tightness in posterior aspect of knee w/gait (approx 50 FTx2). Pt performed and cleared stairs (8 steps,(B)rails, CGA);returned to chair at bedside for lunch. Pt remained safely in chair w/tray in place for lunch while therapist refilled ice bag. Upon return, noted pt standing at chair side walking to bathroom w/  assist (w/o RW). Encouraged pt to use RW (w/side step to bathroom d/t visitor cot). Supervised pt to bathroom, left in  care and informed  to call out for assist back to bed using red call bell cord in bathroom.   Pt continues to demonstrate safety and stability w/ mobility and transfers; clear from PT standpoint once medically cleared. Progression toward goals:  [ ]      Improving appropriately and progressing toward goals  [X]      Improving slowly and progressing toward goals  [ ]      Not making progress toward goals and plan of care will be adjusted       PLAN:  Patient continues to benefit from skilled intervention to address the above impairments. Continue treatment per established plan of care. Discharge Recommendations:  Home Health  Further Equipment Recommendations for Discharge:  RW provided by CMS       SUBJECTIVE:   Patient continues to report tightness posterior aspect of knee (per pt  translation). OBJECTIVE DATA SUMMARY:   Critical Behavior:  Neurologic State: Alert  Orientation Level: Oriented X4  Cognition: Follows commands  Safety/Judgement: Awareness of environment                             Functional Mobility Training:  Bed Mobility:     Supine to Sit: Modified independent  Sit to Supine:  (pt returned to chair)                       Transfers:  Sit to Stand: Stand-by asssistance  Stand to Sit: Stand-by asssistance                             Balance:  Sitting: Intact  Standing: With support  Ambulation/Gait Training:  Distance (ft): 50 Feet (ft)  Assistive Device: Gait belt;Walker, rolling  Ambulation - Level of Assistance: Stand-by asssistance;Contact guard assistance        Gait Abnormalities: Antalgic;Decreased step clearance  Right Side Weight Bearing: As tolerated     Base of Support: Narrowed  Stance: Right decreased  Speed/Kyleigh: Pace decreased (<100 feet/min); Slow  Step Length: Right lengthened;Left shortened                    Stairs:  Number of Stairs Trained: 8  Stairs - Level of Assistance: Contact guard assistance  Rail Use: Both  Therapeutic Exercises:     EXERCISE   Sets   Reps   Active Active Assist   Passive Self ROM   Comments   Ankle Pumps     [ ]                                [ ]                                [ ]                                [ ] Quad Sets     [ ]                                [ ]                                [ ]                                [ ]                                    Hamstring Sets     [ ]                                [ ]                                [ ]                                [ ]                                    Griselda Pedroza     [ ]                                [ ]                                [ ]                                [ ]                                    Yanelis Mendez       [ ]                                  [ ]                                  [ ]                                  [ ]                                    Jeromy Adamess     [ ]                                [ ]                                [ ]                                [ ]                                    Cintia Lambing   8 [X]                                [ ]                                [ ]                                [ ]                                    Knee Flexion Stretch   10 [X]                                [ ]                                [ ]                                [ ]                                    William Curb     [ ]                                [ ]                                [ ]                                [ ]                                          Pain:                    Activity Tolerance:   Good  Please refer to the flowsheet for vital signs taken during this treatment.   After treatment:   [X] Patient left in no apparent distress sitting up in chair  [ ] Patient left in no apparent distress in bed  [X] Call bell left within reach  [X] Nursing notified  [ ] Caregiver present  [ ] Bed alarm activated      COMMUNICATION/COLLABORATION:   The patients plan of care was discussed with: Registered Nurse Marco Epps PTA   Time Calculation: 30 mins

## 2017-04-08 ENCOUNTER — HOME CARE VISIT (OUTPATIENT)
Dept: SCHEDULING | Facility: HOME HEALTH | Age: 67
End: 2017-04-08
Payer: COMMERCIAL

## 2017-04-08 VITALS
DIASTOLIC BLOOD PRESSURE: 80 MMHG | SYSTOLIC BLOOD PRESSURE: 148 MMHG | HEIGHT: 64 IN | WEIGHT: 212.08 LBS | BODY MASS INDEX: 36.21 KG/M2 | OXYGEN SATURATION: 96 % | HEART RATE: 90 BPM | TEMPERATURE: 98 F | RESPIRATION RATE: 16 BRPM

## 2017-04-08 PROCEDURE — G0151 HHCP-SERV OF PT,EA 15 MIN: HCPCS

## 2017-04-10 ENCOUNTER — HOME CARE VISIT (OUTPATIENT)
Dept: SCHEDULING | Facility: HOME HEALTH | Age: 67
End: 2017-04-10
Payer: COMMERCIAL

## 2017-04-10 VITALS
RESPIRATION RATE: 20 BRPM | HEART RATE: 88 BPM | OXYGEN SATURATION: 98 % | DIASTOLIC BLOOD PRESSURE: 80 MMHG | SYSTOLIC BLOOD PRESSURE: 160 MMHG | TEMPERATURE: 97.5 F

## 2017-04-10 VITALS
RESPIRATION RATE: 16 BRPM | HEART RATE: 96 BPM | OXYGEN SATURATION: 97 % | SYSTOLIC BLOOD PRESSURE: 150 MMHG | DIASTOLIC BLOOD PRESSURE: 89 MMHG | TEMPERATURE: 98.2 F

## 2017-04-10 LAB
INR BLD: 2.5 (ref 0.9–1.1)
PT POC: 29.7 SEC

## 2017-04-10 PROCEDURE — G0299 HHS/HOSPICE OF RN EA 15 MIN: HCPCS

## 2017-04-10 PROCEDURE — G0157 HHC PT ASSISTANT EA 15: HCPCS

## 2017-04-12 ENCOUNTER — HOME CARE VISIT (OUTPATIENT)
Dept: SCHEDULING | Facility: HOME HEALTH | Age: 67
End: 2017-04-12
Payer: COMMERCIAL

## 2017-04-12 VITALS
RESPIRATION RATE: 20 BRPM | SYSTOLIC BLOOD PRESSURE: 140 MMHG | DIASTOLIC BLOOD PRESSURE: 80 MMHG | TEMPERATURE: 97.5 F | OXYGEN SATURATION: 98 % | HEART RATE: 88 BPM

## 2017-04-12 PROCEDURE — G0157 HHC PT ASSISTANT EA 15: HCPCS

## 2017-04-13 ENCOUNTER — HOME CARE VISIT (OUTPATIENT)
Dept: SCHEDULING | Facility: HOME HEALTH | Age: 67
End: 2017-04-13
Payer: COMMERCIAL

## 2017-04-13 VITALS
DIASTOLIC BLOOD PRESSURE: 78 MMHG | OXYGEN SATURATION: 98 % | TEMPERATURE: 98.3 F | SYSTOLIC BLOOD PRESSURE: 118 MMHG | HEART RATE: 76 BPM | RESPIRATION RATE: 16 BRPM

## 2017-04-13 LAB
INR BLD: 2.4 (ref 0.9–1.1)
PT POC: 28.3 SEC

## 2017-04-13 PROCEDURE — G0300 HHS/HOSPICE OF LPN EA 15 MIN: HCPCS

## 2017-04-14 ENCOUNTER — HOME CARE VISIT (OUTPATIENT)
Dept: SCHEDULING | Facility: HOME HEALTH | Age: 67
End: 2017-04-14
Payer: COMMERCIAL

## 2017-04-14 PROCEDURE — G0157 HHC PT ASSISTANT EA 15: HCPCS

## 2017-04-15 ENCOUNTER — HOME CARE VISIT (OUTPATIENT)
Dept: HOME HEALTH SERVICES | Facility: HOME HEALTH | Age: 67
End: 2017-04-15
Payer: COMMERCIAL

## 2017-04-15 VITALS
DIASTOLIC BLOOD PRESSURE: 75 MMHG | SYSTOLIC BLOOD PRESSURE: 160 MMHG | HEART RATE: 109 BPM | RESPIRATION RATE: 20 BRPM | OXYGEN SATURATION: 99 % | TEMPERATURE: 97.5 F

## 2017-04-15 PROCEDURE — G0155 HHCP-SVS OF CSW,EA 15 MIN: HCPCS

## 2017-04-17 ENCOUNTER — HOME CARE VISIT (OUTPATIENT)
Dept: SCHEDULING | Facility: HOME HEALTH | Age: 67
End: 2017-04-17
Payer: COMMERCIAL

## 2017-04-17 VITALS
DIASTOLIC BLOOD PRESSURE: 78 MMHG | OXYGEN SATURATION: 97 % | HEART RATE: 90 BPM | SYSTOLIC BLOOD PRESSURE: 118 MMHG | RESPIRATION RATE: 16 BRPM | TEMPERATURE: 98 F

## 2017-04-17 VITALS
TEMPERATURE: 97.5 F | DIASTOLIC BLOOD PRESSURE: 85 MMHG | RESPIRATION RATE: 20 BRPM | OXYGEN SATURATION: 97 % | SYSTOLIC BLOOD PRESSURE: 160 MMHG | HEART RATE: 95 BPM

## 2017-04-17 LAB
INR BLD: 1.2 (ref 0.9–1.1)
PT POC: 13.9 SEC

## 2017-04-17 PROCEDURE — G0157 HHC PT ASSISTANT EA 15: HCPCS

## 2017-04-17 PROCEDURE — G0300 HHS/HOSPICE OF LPN EA 15 MIN: HCPCS

## 2017-04-19 ENCOUNTER — HOME CARE VISIT (OUTPATIENT)
Dept: SCHEDULING | Facility: HOME HEALTH | Age: 67
End: 2017-04-19
Payer: COMMERCIAL

## 2017-04-19 PROCEDURE — G0151 HHCP-SERV OF PT,EA 15 MIN: HCPCS

## 2017-04-20 ENCOUNTER — HOME CARE VISIT (OUTPATIENT)
Dept: SCHEDULING | Facility: HOME HEALTH | Age: 67
End: 2017-04-20
Payer: COMMERCIAL

## 2017-04-20 VITALS
SYSTOLIC BLOOD PRESSURE: 140 MMHG | RESPIRATION RATE: 18 BRPM | HEART RATE: 90 BPM | TEMPERATURE: 97.8 F | OXYGEN SATURATION: 98 % | DIASTOLIC BLOOD PRESSURE: 78 MMHG

## 2017-04-20 VITALS — DIASTOLIC BLOOD PRESSURE: 80 MMHG | SYSTOLIC BLOOD PRESSURE: 130 MMHG

## 2017-04-20 LAB
INR BLD: 1 (ref 0.9–1.1)
PT POC: 11.7 SEC

## 2017-04-20 PROCEDURE — G0300 HHS/HOSPICE OF LPN EA 15 MIN: HCPCS

## 2017-04-24 ENCOUNTER — HOME CARE VISIT (OUTPATIENT)
Dept: SCHEDULING | Facility: HOME HEALTH | Age: 67
End: 2017-04-24
Payer: COMMERCIAL

## 2017-04-24 VITALS
HEART RATE: 85 BPM | RESPIRATION RATE: 16 BRPM | TEMPERATURE: 97 F | DIASTOLIC BLOOD PRESSURE: 78 MMHG | SYSTOLIC BLOOD PRESSURE: 138 MMHG | OXYGEN SATURATION: 98 %

## 2017-04-24 LAB
INR BLD: 2.8 (ref 0.9–1.1)
PT POC: 33.8 SEC

## 2017-04-24 PROCEDURE — G0300 HHS/HOSPICE OF LPN EA 15 MIN: HCPCS

## 2017-04-25 NOTE — DISCHARGE SUMMARY
Patient ID:  Dinorah Woods  [id number]  59 y.o.  1950    Admit Date: 4/3/2017    Discharge Date: 4/25/2017    Admission Diagnoses: OSTEOARTHRITIS RIGHT KNEE  Osteoarthritis of right knee    Discharge Diagnoses: Active Problems:    Osteoarthritis of right knee (4/3/2017)         Admission Condition: Good    Discharge Condition: Good    Last Procedure: Procedure(s):  RIGHT TOTAL KNEE ARTHROPLASTY       Medications:   No current facility-administered medications for this encounter. Current Outpatient Prescriptions   Medication Sig    warfarin (COUMADIN) 5 mg tablet Take 1 Tab by mouth daily. (Patient taking differently: Take 1.5 Tabs by mouth daily.)    oxyCODONE IR (ROXICODONE) 5 mg immediate release tablet Take 1 Tab by mouth every three (3) hours as needed. Max Daily Amount: 40 mg.    traMADol (ULTRAM) 50 mg tablet Take 1 Tab by mouth every six (6) hours as needed for Pain. Max Daily Amount: 200 mg.    diclofenac EC (VOLTAREN) 75 mg EC tablet Take 1 Tab by mouth two (2) times a day.  lisinopril-hydrochlorothiazide (PRINZIDE, ZESTORETIC) 20-25 mg per tablet Take 1 Tab by mouth daily.  lovastatin (MEVACOR) 40 mg tablet Take 40 mg by mouth daily.  sitaGLIPtin-metFORMIN (JANUMET)  mg per tablet Take 1 Tab by mouth two (2) times daily (with meals).  naproxen (NAPROSYN) 375 mg tablet Take 375 mg by mouth two (2) times daily (with meals). Hospital Course: The patient was admitted to the hospital on the day of surgery and underwent total knee arthroplasty. They tolerated the procedure well. Pain was controleld post-operatively with a multimodal pain regiment including celebrex, tylenol,tramadol,  toradol (if normal creatinine) and oxycodone for breakthrough. The rod catheter was removed on POD#1. Physical therapy began to work with the patient on POD#0 and continued daily. 24 hours of perioperative antibiotics were completed.  She was noted to have significant calf swelling and doppler revealed DVT. Coumadin treatment was begun and hsopitalist consult ordered. . The INR/PT was checked daily and INR was adjusted accordingly. The patient progressed well and was discharged home in stable condition once they cleared therapy. Consults: Hospitalist    Significant Diagnostic Studies: post op xrays showed a stable Procedure(s):  RIGHT TOTAL KNEE ARTHROPLASTY     Disposition: home    The patient was discharged with the following instructions:   1.) She will take coumadin for DVT prophylaxis, tylenol, celebrex, and oxycodone for breakthrough pain. INR will be checked weekly by home health.   2.) Shower and wound instructions were given. 4.) Activity: Activity as tolerated  5.) Diet: Regular      Follow-up with Dustin Weaver MD in 3 weeks after her discharge. Sooner if there is a problem. Cannot display discharge medications since this patient is not currently admitted.       Signed:  Dustin Weaver MD  4/25/2017, 10:25 AM    Follow-up tests/labs - weekly labwork while on coumadin

## 2017-04-27 ENCOUNTER — HOME CARE VISIT (OUTPATIENT)
Dept: SCHEDULING | Facility: HOME HEALTH | Age: 67
End: 2017-04-27
Payer: COMMERCIAL

## 2017-04-27 VITALS
SYSTOLIC BLOOD PRESSURE: 124 MMHG | RESPIRATION RATE: 16 BRPM | HEART RATE: 88 BPM | OXYGEN SATURATION: 99 % | TEMPERATURE: 97.8 F | DIASTOLIC BLOOD PRESSURE: 80 MMHG

## 2017-04-27 LAB
INR BLD: 2.9 (ref 0.9–1.1)
PT POC: 34.6 SEC

## 2017-04-27 PROCEDURE — G0300 HHS/HOSPICE OF LPN EA 15 MIN: HCPCS

## 2017-04-29 ENCOUNTER — HOME CARE VISIT (OUTPATIENT)
Dept: HOME HEALTH SERVICES | Facility: HOME HEALTH | Age: 67
End: 2017-04-29
Payer: COMMERCIAL

## 2017-04-29 PROCEDURE — G0155 HHCP-SVS OF CSW,EA 15 MIN: HCPCS

## 2017-05-01 ENCOUNTER — HOME CARE VISIT (OUTPATIENT)
Dept: SCHEDULING | Facility: HOME HEALTH | Age: 67
End: 2017-05-01
Payer: COMMERCIAL

## 2017-05-01 VITALS
HEART RATE: 88 BPM | OXYGEN SATURATION: 98 % | RESPIRATION RATE: 16 BRPM | TEMPERATURE: 98.4 F | DIASTOLIC BLOOD PRESSURE: 60 MMHG | SYSTOLIC BLOOD PRESSURE: 110 MMHG

## 2017-05-01 LAB
INR, POC: 1.5 (ref 0.7–1.2)
PROTHROMBIN TIME, POC: 18.4 SECONDS (ref 6.5–11.9)

## 2017-05-01 PROCEDURE — G0300 HHS/HOSPICE OF LPN EA 15 MIN: HCPCS

## 2017-05-04 ENCOUNTER — HOME CARE VISIT (OUTPATIENT)
Dept: SCHEDULING | Facility: HOME HEALTH | Age: 67
End: 2017-05-04
Payer: COMMERCIAL

## 2017-05-04 VITALS
DIASTOLIC BLOOD PRESSURE: 60 MMHG | SYSTOLIC BLOOD PRESSURE: 120 MMHG | RESPIRATION RATE: 16 BRPM | HEART RATE: 86 BPM | TEMPERATURE: 98 F

## 2017-05-04 LAB
INR BLD: 1 (ref 0.9–1.1)
PT POC: 11.9 SEC

## 2017-05-04 PROCEDURE — G0300 HHS/HOSPICE OF LPN EA 15 MIN: HCPCS

## 2017-05-08 ENCOUNTER — HOME CARE VISIT (OUTPATIENT)
Dept: SCHEDULING | Facility: HOME HEALTH | Age: 67
End: 2017-05-08
Payer: COMMERCIAL

## 2017-05-08 VITALS
HEART RATE: 81 BPM | DIASTOLIC BLOOD PRESSURE: 66 MMHG | RESPIRATION RATE: 16 BRPM | TEMPERATURE: 97.6 F | SYSTOLIC BLOOD PRESSURE: 120 MMHG | OXYGEN SATURATION: 96 %

## 2017-05-08 LAB
INR BLD: 1 (ref 0.9–1.1)
PT POC: 12.5 SEC

## 2017-05-08 PROCEDURE — G0300 HHS/HOSPICE OF LPN EA 15 MIN: HCPCS

## 2017-05-11 ENCOUNTER — HOME CARE VISIT (OUTPATIENT)
Dept: SCHEDULING | Facility: HOME HEALTH | Age: 67
End: 2017-05-11
Payer: COMMERCIAL

## 2017-05-11 VITALS
OXYGEN SATURATION: 95 % | RESPIRATION RATE: 16 BRPM | HEART RATE: 68 BPM | DIASTOLIC BLOOD PRESSURE: 80 MMHG | SYSTOLIC BLOOD PRESSURE: 121 MMHG | TEMPERATURE: 97.8 F

## 2017-05-11 LAB
INR BLD: 2 (ref 0.9–1.1)
PT POC: 24.2 SEC

## 2017-05-11 PROCEDURE — G0299 HHS/HOSPICE OF RN EA 15 MIN: HCPCS

## 2017-06-05 ENCOUNTER — HOSPITAL ENCOUNTER (INPATIENT)
Age: 67
LOS: 3 days | Discharge: HOME | DRG: 125 | End: 2017-06-08
Attending: STUDENT IN AN ORGANIZED HEALTH CARE EDUCATION/TRAINING PROGRAM | Admitting: INTERNAL MEDICINE
Payer: SUBSIDIZED

## 2017-06-05 ENCOUNTER — APPOINTMENT (OUTPATIENT)
Dept: CT IMAGING | Age: 67
DRG: 125 | End: 2017-06-05
Attending: STUDENT IN AN ORGANIZED HEALTH CARE EDUCATION/TRAINING PROGRAM
Payer: SUBSIDIZED

## 2017-06-05 ENCOUNTER — APPOINTMENT (OUTPATIENT)
Dept: GENERAL RADIOLOGY | Age: 67
DRG: 125 | End: 2017-06-05
Attending: INTERNAL MEDICINE
Payer: SUBSIDIZED

## 2017-06-05 DIAGNOSIS — R79.1 SUPRATHERAPEUTIC INR: Primary | ICD-10-CM

## 2017-06-05 DIAGNOSIS — H44.812 EYE HEMORRHAGE, LEFT: ICD-10-CM

## 2017-06-05 PROBLEM — H57.89: Status: ACTIVE | Noted: 2017-06-05

## 2017-06-05 PROBLEM — D68.9 COAGULOPATHY (HCC): Status: ACTIVE | Noted: 2017-06-05

## 2017-06-05 LAB
ALBUMIN SERPL BCP-MCNC: 3.6 G/DL (ref 3.5–5)
ALBUMIN/GLOB SERPL: 0.8 {RATIO} (ref 1.1–2.2)
ALP SERPL-CCNC: 74 U/L (ref 45–117)
ALT SERPL-CCNC: 21 U/L (ref 12–78)
ANION GAP BLD CALC-SCNC: 11 MMOL/L (ref 5–15)
APTT PPP: >130 SEC (ref 22.1–32.5)
AST SERPL W P-5'-P-CCNC: 20 U/L (ref 15–37)
BASOPHILS # BLD AUTO: 0 K/UL (ref 0–0.1)
BASOPHILS # BLD: 0 % (ref 0–1)
BILIRUB SERPL-MCNC: 0.9 MG/DL (ref 0.2–1)
BUN SERPL-MCNC: 20 MG/DL (ref 6–20)
BUN/CREAT SERPL: 18 (ref 12–20)
CALCIUM SERPL-MCNC: 9.8 MG/DL (ref 8.5–10.1)
CHLORIDE SERPL-SCNC: 93 MMOL/L (ref 97–108)
CO2 SERPL-SCNC: 26 MMOL/L (ref 21–32)
CREAT SERPL-MCNC: 1.1 MG/DL (ref 0.55–1.02)
EOSINOPHIL # BLD: 0 K/UL (ref 0–0.4)
EOSINOPHIL NFR BLD: 1 % (ref 0–7)
ERYTHROCYTE [DISTWIDTH] IN BLOOD BY AUTOMATED COUNT: 14.5 % (ref 11.5–14.5)
GLOBULIN SER CALC-MCNC: 4.3 G/DL (ref 2–4)
GLUCOSE BLD STRIP.AUTO-MCNC: 147 MG/DL (ref 65–100)
GLUCOSE SERPL-MCNC: 134 MG/DL (ref 65–100)
HCT VFR BLD AUTO: 26.1 % (ref 35–47)
HGB BLD-MCNC: 8.6 G/DL (ref 11.5–16)
INR PPP: >11 (ref 0.9–1.1)
LYMPHOCYTES # BLD AUTO: 24 % (ref 12–49)
LYMPHOCYTES # BLD: 2 K/UL (ref 0.8–3.5)
MCH RBC QN AUTO: 26.6 PG (ref 26–34)
MCHC RBC AUTO-ENTMCNC: 33 G/DL (ref 30–36.5)
MCV RBC AUTO: 80.8 FL (ref 80–99)
MONOCYTES # BLD: 0.6 K/UL (ref 0–1)
MONOCYTES NFR BLD AUTO: 7 % (ref 5–13)
NEUTS SEG # BLD: 5.7 K/UL (ref 1.8–8)
NEUTS SEG NFR BLD AUTO: 68 % (ref 32–75)
PLATELET # BLD AUTO: 338 K/UL (ref 150–400)
POTASSIUM SERPL-SCNC: 3.8 MMOL/L (ref 3.5–5.1)
PROT SERPL-MCNC: 7.9 G/DL (ref 6.4–8.2)
PROTHROMBIN TIME: >120 SEC (ref 9–11.1)
RBC # BLD AUTO: 3.23 M/UL (ref 3.8–5.2)
SERVICE CMNT-IMP: ABNORMAL
SODIUM SERPL-SCNC: 130 MMOL/L (ref 136–145)
THERAPEUTIC RANGE,PTTT: ABNORMAL SECS (ref 58–77)
WBC # BLD AUTO: 8.4 K/UL (ref 3.6–11)

## 2017-06-05 PROCEDURE — P9059 PLASMA, FRZ BETWEEN 8-24HOUR: HCPCS | Performed by: STUDENT IN AN ORGANIZED HEALTH CARE EDUCATION/TRAINING PROGRAM

## 2017-06-05 PROCEDURE — 74011250636 HC RX REV CODE- 250/636: Performed by: INTERNAL MEDICINE

## 2017-06-05 PROCEDURE — 73560 X-RAY EXAM OF KNEE 1 OR 2: CPT

## 2017-06-05 PROCEDURE — 70450 CT HEAD/BRAIN W/O DYE: CPT

## 2017-06-05 PROCEDURE — 70481 CT ORBIT/EAR/FOSSA W/DYE: CPT

## 2017-06-05 PROCEDURE — 65270000029 HC RM PRIVATE

## 2017-06-05 PROCEDURE — 36430 TRANSFUSION BLD/BLD COMPNT: CPT

## 2017-06-05 PROCEDURE — 36415 COLL VENOUS BLD VENIPUNCTURE: CPT | Performed by: STUDENT IN AN ORGANIZED HEALTH CARE EDUCATION/TRAINING PROGRAM

## 2017-06-05 PROCEDURE — 74011000258 HC RX REV CODE- 258: Performed by: STUDENT IN AN ORGANIZED HEALTH CARE EDUCATION/TRAINING PROGRAM

## 2017-06-05 PROCEDURE — 99285 EMERGENCY DEPT VISIT HI MDM: CPT

## 2017-06-05 PROCEDURE — 74011250636 HC RX REV CODE- 250/636: Performed by: STUDENT IN AN ORGANIZED HEALTH CARE EDUCATION/TRAINING PROGRAM

## 2017-06-05 PROCEDURE — 96365 THER/PROPH/DIAG IV INF INIT: CPT

## 2017-06-05 PROCEDURE — 74011250637 HC RX REV CODE- 250/637: Performed by: INTERNAL MEDICINE

## 2017-06-05 PROCEDURE — 82962 GLUCOSE BLOOD TEST: CPT

## 2017-06-05 PROCEDURE — 80053 COMPREHEN METABOLIC PANEL: CPT | Performed by: STUDENT IN AN ORGANIZED HEALTH CARE EDUCATION/TRAINING PROGRAM

## 2017-06-05 PROCEDURE — 85025 COMPLETE CBC W/AUTO DIFF WBC: CPT | Performed by: STUDENT IN AN ORGANIZED HEALTH CARE EDUCATION/TRAINING PROGRAM

## 2017-06-05 PROCEDURE — 85610 PROTHROMBIN TIME: CPT | Performed by: STUDENT IN AN ORGANIZED HEALTH CARE EDUCATION/TRAINING PROGRAM

## 2017-06-05 PROCEDURE — 51798 US URINE CAPACITY MEASURE: CPT

## 2017-06-05 PROCEDURE — 85730 THROMBOPLASTIN TIME PARTIAL: CPT | Performed by: STUDENT IN AN ORGANIZED HEALTH CARE EDUCATION/TRAINING PROGRAM

## 2017-06-05 PROCEDURE — 74011636320 HC RX REV CODE- 636/320: Performed by: STUDENT IN AN ORGANIZED HEALTH CARE EDUCATION/TRAINING PROGRAM

## 2017-06-05 RX ORDER — METHYLPREDNISOLONE 4 MG/1
4 TABLET ORAL
Status: DISCONTINUED | OUTPATIENT
Start: 2017-06-08 | End: 2017-06-08 | Stop reason: HOSPADM

## 2017-06-05 RX ORDER — METHYLPREDNISOLONE 4 MG/1
4 TABLET ORAL ONCE
Status: COMPLETED | OUTPATIENT
Start: 2017-06-05 | End: 2017-06-06

## 2017-06-05 RX ORDER — METHYLPREDNISOLONE 4 MG/1
4 TABLET ORAL
Status: COMPLETED | OUTPATIENT
Start: 2017-06-06 | End: 2017-06-06

## 2017-06-05 RX ORDER — METHYLPREDNISOLONE 4 MG/1
8 TABLET ORAL ONCE
Status: COMPLETED | OUTPATIENT
Start: 2017-06-07 | End: 2017-06-07

## 2017-06-05 RX ORDER — SODIUM CHLORIDE 9 MG/ML
250 INJECTION, SOLUTION INTRAVENOUS AS NEEDED
Status: DISCONTINUED | OUTPATIENT
Start: 2017-06-05 | End: 2017-06-08 | Stop reason: HOSPADM

## 2017-06-05 RX ORDER — LOVASTATIN 20 MG/1
40 TABLET ORAL
Status: DISCONTINUED | OUTPATIENT
Start: 2017-06-05 | End: 2017-06-08 | Stop reason: HOSPADM

## 2017-06-05 RX ORDER — METHYLPREDNISOLONE 4 MG/1
8 TABLET ORAL ONCE
Status: COMPLETED | OUTPATIENT
Start: 2017-06-05 | End: 2017-06-06

## 2017-06-05 RX ORDER — SODIUM CHLORIDE 0.9 % (FLUSH) 0.9 %
10 SYRINGE (ML) INJECTION
Status: COMPLETED | OUTPATIENT
Start: 2017-06-05 | End: 2017-06-05

## 2017-06-05 RX ORDER — METHYLPREDNISOLONE 4 MG/1
4 TABLET ORAL
Status: DISCONTINUED | OUTPATIENT
Start: 2017-06-09 | End: 2017-06-08 | Stop reason: HOSPADM

## 2017-06-05 RX ORDER — WARFARIN SODIUM 5 MG/1
7.5 TABLET ORAL DAILY
COMMUNITY
End: 2017-06-08

## 2017-06-05 RX ORDER — SODIUM CHLORIDE 9 MG/ML
50 INJECTION, SOLUTION INTRAVENOUS CONTINUOUS
Status: DISCONTINUED | OUTPATIENT
Start: 2017-06-05 | End: 2017-06-07

## 2017-06-05 RX ORDER — METHYLPREDNISOLONE 4 MG/1
4 TABLET ORAL
Status: COMPLETED | OUTPATIENT
Start: 2017-06-07 | End: 2017-06-08

## 2017-06-05 RX ORDER — METHYLPREDNISOLONE 4 MG/1
4 TABLET ORAL
Status: DISCONTINUED | OUTPATIENT
Start: 2017-06-10 | End: 2017-06-08 | Stop reason: HOSPADM

## 2017-06-05 RX ADMIN — SODIUM CHLORIDE 100 ML: 900 INJECTION, SOLUTION INTRAVENOUS at 18:19

## 2017-06-05 RX ADMIN — LOVASTATIN 40 MG: 20 TABLET ORAL at 22:49

## 2017-06-05 RX ADMIN — Medication 10 ML: at 18:19

## 2017-06-05 RX ADMIN — SODIUM CHLORIDE 75 ML/HR: 900 INJECTION, SOLUTION INTRAVENOUS at 22:35

## 2017-06-05 RX ADMIN — PHYTONADIONE 10 MG: 10 INJECTION, EMULSION INTRAMUSCULAR; INTRAVENOUS; SUBCUTANEOUS at 19:09

## 2017-06-05 RX ADMIN — IOPAMIDOL 100 ML: 612 INJECTION, SOLUTION INTRAVENOUS at 18:19

## 2017-06-05 NOTE — PROGRESS NOTES
Patient seen and examined. Discussed case with Dr Marge Castillo. This appears to be an ophthalmological emergency. Visit Vitals    /63    SpO2 100%     Impression  - Intraocular hemorrhage  - ?  Retinal detachment  - Coumadin coagulopathy    Plan  - ED to notify ophthalmologist to come in and see the patient  - start FFP  - Start Vitamin K  - Will follow up ophthalmologist evaluation  - ED to notify hospitalist following ophthalmologist evaluation

## 2017-06-05 NOTE — PROGRESS NOTES
Admission Medication Reconciliation:    Information obtained from: family member  Removed diclofenac, naproxen, and oxycodone. Warfarin dose 7.5mg daily. Prescription bottle indicates 5mg daily. Daughter states she was instructed to give 1.5 tablets (7.5mg)    Last dose 6/4/17         Significant PMH/Disease States:   Past Medical History:   Diagnosis Date    Arthritis     Diabetes ([address]     Hypertension        Chief Complaint for this Admission: eye swelling    Allergies:  Review of patient's allergies indicates no known allergies. Prior to Admission Medications:   Prior to Admission Medications   Prescriptions Last Dose Informant Patient Reported? Taking?   lisinopril-hydrochlorothiazide (PRINZIDE, ZESTORETIC) 20-25 mg per tablet 6/4/2017 at Unknown time  Yes Yes   Sig: Take 1 Tab by mouth daily. lovastatin (MEVACOR) 40 mg tablet 6/4/2017 at Unknown time  Yes Yes   Sig: Take 40 mg by mouth daily. sitaGLIPtin-metFORMIN (JANUMET)  mg per tablet 6/4/2017 at Unknown time  Yes Yes   Sig: Take 1 Tab by mouth two (2) times daily (with meals). traMADol (ULTRAM) 50 mg tablet   No No   Sig: Take 1 Tab by mouth every six (6) hours as needed for Pain. Max Daily Amount: 200 mg.   warfarin (COUMADIN) 5 mg tablet 6/4/2017 at pm  Yes Yes   Sig: Take 7.5 mg by mouth daily. Indications: DEEP VEIN THROMBOSIS PREVENTION      Facility-Administered Medications: None     Comments/Recommendations: Medication review done with family member. Removed diclofenac, naproxen, and oxycodone. Warfarin dose 7.5mg daily. Last dose 6/4/17. Prescription bottle indicates 5mg daily. Daughter states she was instructed to give 1.5 tablets (7.5mg)    No known medication allergies.

## 2017-06-05 NOTE — IP AVS SNAPSHOT
[address] 
[phone] Patient: Meghan Corbett MRN: [id number] CUJ:[id number] You are allergic to the following No active allergies Recent Documentation Height Weight Breastfeeding? BMI OB Status Smoking Status 1.61 m 72.8 kg No 28.09 kg/m2 Postmenopausal Never Smoker Emergency Contacts  (Rel.) Home Phone Work Phone Mobile Phone Salina Gill (Child) -- -- [phone] About your hospitalization You were admitted on:  June 5, 2017 You last received care in the:  [facility] You were discharged on:  June 8, 2017 Why you were hospitalized Your primary diagnosis was:  Ocular Hemorrhage Your diagnoses also included:  Coagulopathy (Hcc) Providers Seen During Your Hospitalizations Provider Role Specialty Primary office phone Vinay Mckeon MD Attending Provider Emergency Medicine [phone] Miguelina Lake MD Attending Provider Internal Medicine [phone] Tess Moscoso MD Attending Provider Internal Medicine [phone] Your Primary Care Physician (PCP) Primary Care Physician Office Phone Office Fax Paolo Huddleston [phone] Follow-up Information Follow up With Details Comments Contact Info Toi Leone MD   [address] [facility] [address] [facility] 
[phone] Napoleon Redd MD On 6/9/2017 Call his office . Phone [phone] [facility] [address] 
[phone] Current Discharge Medication List  
  
START taking these medications Dose & Instructions Dispensing Information Comments Morning Noon Evening Bedtime  
 brimonidine 0.2 % ophthalmic solution Commonly known as:  Anell Sciara Your last dose was: Your next dose is:    
   
   
 Dose:  1 Drop Administer 1 Drop to left eye every eight (8) hours. Quantity:  15 mL Refills:  0  
     
   
   
   
  
 dorzolamide-timolol 22.3-6.8 mg/mL ophthalmic solution Commonly known as:  COSOPT Your last dose was: Your next dose is:    
   
   
 Dose:  1 Drop Administer 1 Drop to left eye three (3) times daily. Quantity:  10 mL Refills:  0  
     
   
   
   
  
 methylPREDNISolone 4 mg Tab Commonly known as:  MEDROL Your last dose was: Your next dose is:    
   
   
 Medrol 4 mg tablet. Day 1:1 tablet three times daily for 3 doses day 1                                Day 2:1 tablet 2 times daily for 2 doses,                                Day 3:1 tablet  Once and stop Quantity:  6 Tab Refills:  0 CONTINUE these medications which have NOT CHANGED Dose & Instructions Dispensing Information Comments Morning Noon Evening Bedtime JANUMET  mg per tablet Generic drug:  SITagliptin-metFORMIN Your last dose was: Your next dose is:    
   
   
 Dose:  1 Tab Take 1 Tab by mouth two (2) times daily (with meals). Refills:  0  
     
   
   
   
  
 lisinopril-hydroCHLOROthiazide 20-25 mg per tablet Commonly known as:  Sumanth Kolb Your last dose was: Your next dose is:    
   
   
 Dose:  1 Tab Take 1 Tab by mouth daily. Refills:  0  
     
   
   
   
  
 lovastatin 40 mg tablet Commonly known as:  MEVACOR Your last dose was: Your next dose is:    
   
   
 Dose:  40 mg Take 40 mg by mouth daily. Refills:  0  
     
   
   
   
  
 traMADol 50 mg tablet Commonly known as:  ULTRAM  
   
Your last dose was: Your next dose is:    
   
   
 Dose:  50 mg Take 1 Tab by mouth every six (6) hours as needed for Pain. Max Daily Amount: 200 mg. Quantity:  60 Tab Refills:  0 STOP taking these medications   
 warfarin 5 mg tablet Commonly known as:  COUMADIN Where to Get Your Medications Information on where to get these meds will be given to you by the nurse or doctor. ! Ask your nurse or doctor about these medications  
  brimonidine 0.2 % ophthalmic solution  
 dorzolamide-timolol 22.3-6.8 mg/mL ophthalmic solution  
 methylPREDNISolone 4 mg Tab Discharge Instructions Discharge Instructions PATIENT ID: Mecca Ramirez MRN: [id number] YOB: 1950 DATE OF ADMISSION: 6/5/2017  3:54 PM   
DATE OF DISCHARGE: 6/8/2017 PRIMARY CARE PROVIDER: Haseeb Payne MD  
 
ATTENDING PHYSICIAN: Gabrielle Samuel MD 
DISCHARGING PROVIDER: Gabrielle Samuel MD   
To contact this individual call [phone] and ask the  to page. If unavailable ask to be transferred the Adult Hospitalist Department. DISCHARGE DIAGNOSES Intraorbital hemorrhage while on warfarin with vision loss Right subconjunctival hakan ramos CONSULTATIONS: None PROCEDURES/SURGERIES: * No surgery found * PENDING TEST RESULTS:  
At the time of discharge the following test results are still pending: none FOLLOW UP APPOINTMENTS:  
Follow-up Information Follow up With Details Comments Contact Info Haseeb Payne MD   [address] [facility] [address] [facility] 
[phone] Shakira Underwood MD On 6/9/2017 Call his office . Phone [phone] [facility] [address] [phone] ADDITIONAL CARE RECOMMENDATIONS:  
I have called and discussed with Dr Jeanmarie Feliz today prior to discharge. he would like to see him in the office tomorrow. Do not take any blood thinning medications such as warfarin,do not take aspirin and other NSAIDS such as Ibuprofen,Motrin. Aleve etc. 
 
DIET: Cardiac Diet and Diabetic Diet ACTIVITY: Activity as tolerated WOUND CARE: NA 
 
EQUIPMENT needed: NA 
 
 
DISCHARGE MEDICATIONS: 
 See Medication Reconciliation Form · It is important that you take the medication exactly as they are prescribed. · Keep your medication in the bottles provided by the pharmacist and keep a list of the medication names, dosages, and times to be taken in your wallet. · Do not take other medications without consulting your doctor. NOTIFY YOUR PHYSICIAN FOR ANY OF THE FOLLOWING:  
Fever over 101 degrees for 24 hours. Chest pain, shortness of breath, fever, chills, nausea, vomiting, diarrhea, change in mentation, falling, weakness, bleeding. Severe pain or pain not relieved by medications. Or, any other signs or symptoms that you may have questions about. DISPOSITION: 
x  Home With: 
 OT  PT  [facility]  RN  
  
 SNF/Inpatient Rehab/LTAC Independent/assisted living Hospice Other:  
 
 
 
 
Signed: Mickey Carroll MD 
6/8/2017 
3:55 PM 
 
Discharge Orders None Introducing [facility]! [employer] introduces [employer] patient portal. Now you can access parts of your medical record, email your doctor's office, and request medication refills online. 1. In your internet browser, go to [email or web address] 2. Click on the First Time User? Click Here link in the Sign In box. You will see the New Member Sign Up page. 3. Enter your [employer] Access Code exactly as it appears below. You will not need to use this code after youve completed the sign-up process. If you do not sign up before the expiration date, you must request a new code. · [employer] Access Code: [email or web address] Expires: 6/27/2017  1:18 PM 
 
4. Enter the last four digits of your Social Security Number (xxxx) and Date of Birth (mm/dd/yyyy) as indicated and click Submit. You will be taken to the next sign-up page. 5. Create a [employer] ID. This will be your [employer] login ID and cannot be changed, so think of one that is secure and easy to remember. 6. Create a itzat password. You can change your password at any time. 7. Enter your Password Reset Question and Answer. This can be used at a later time if you forget your password. 8. Enter your e-mail address. You will receive e-mail notification when new information is available in [address] 9. Click Sign Up. You can now view and download portions of your medical record. 10. Click the Download Summary menu link to download a portable copy of your medical information. If you have questions, please visit the Frequently Asked Questions section of the [employer]zat website. Remember, [employer] is NOT to be used for urgent needs. For medical emergencies, dial 911. Now available from your iPhone and Android! General Information Please provide this summary of care documentation to your next provider. Patient Signature:  ____________________________________________________________ Date:  ____________________________________________________________  
  
Fer Ash Provider Signature:  ____________________________________________________________ Date:  ____________________________________________________________

## 2017-06-05 NOTE — ED TRIAGE NOTES
Pt presents with right knee swelling that has gotten worse due to blood clot. Pt had right knee replaced on 4/3/2017 and blood clot was found in her right knee the next day. Pt is currently taking Coumadin. Pt also has swelling with bruising to left eye and was seen by Dr Dawood Catherine @ [facility] this morning. Pt was DX with Hemorrhage of left orbit. Pt denies fall or injury. Pt was prescribed a Medrol Dose Pack but was told to speak with PCP before filling.   Pt was seen by PCP this afternoon ans was instructed to come to ER for CT scan and further eval.

## 2017-06-05 NOTE — ED PROVIDER NOTES
HPI Comments: The patient is a 73-year-old, [place] speaking female, who presents to the emergency department and private vehicle with a chief complaint of left-sided eye pain and vision impairment. The patient's daughter providing majority of history and interpretation. The patient's symptoms began on Thursday, 4 days ago, spontaneously. There is no history of a traumatic event. The patient is status post a total knee replacement in April 2017 and diagnosed DVT on warfarin. The patient made an appointment to see an ophthalmologist, and the appointment was this morning at [facility] with Dr. Remy Parson, who examined the patient and diagnosed her with a left eye hemorrhage. He prescribed eye drops and a Medrol Dosepak and ordered an outpatient CT scan. He requested the patient be seen by her PCP prior to starting the Medrol Dosepak. The patient saw Dr. Abdon Blanton who examined the patient and sent her here for further management. Patient reports no new complaints. She says her eye remains painful and with loss of vision. She has been spitting up blood-streaked saliva. She denies headache, focal weakness, chest pain, abd pain. Patient is a 78 y.o. female presenting with swelling. The history is provided by the patient and a relative. The history is limited by the condition of the patient and a language barrier. A  was used [place] - pt confused, so limited even with this.). Swelling    This is a new problem. The current episode started more than 2 days ago. The problem occurs constantly. The problem has been gradually worsening. The pain is associated with an unknown factor. Pain location: L eye. The quality of the pain is aching. The pain is moderate. Associated symptoms include anorexia and nausea. Pertinent negatives include no fever, no diarrhea, no hematochezia, no vomiting, no headaches, no trauma and no chest pain. Nothing worsens the pain. The pain is relieved by nothing.  Past workup comments: saw VEI and PCP today prior to arrival in ED. . TKR on 4/3/17, DVT post-op, on warfarin       Past Medical History:   Diagnosis Date    Arthritis     Diabetes ([address]     Hypertension        History reviewed. No pertinent surgical history. Family History:   Problem Relation Age of Onset    Anesth Problems Neg Hx        Social History     Social History    Marital status:      Spouse name: N/A    Number of children: N/A    Years of education: N/A     Occupational History    Not on file. Social History Main Topics    Smoking status: Never Smoker    Smokeless tobacco: Never Used    Alcohol use No    Drug use: No    Sexual activity: Not on file     Other Topics Concern    Not on file     Social History Narrative         ALLERGIES: Review of patient's allergies indicates no known allergies. Review of Systems   Unable to perform ROS: Mental status change   Constitutional: Negative for fever. Cardiovascular: Negative for chest pain. Gastrointestinal: Positive for anorexia and nausea. Negative for diarrhea, hematochezia and vomiting. Neurological: Negative for headaches. There were no vitals filed for this visit. Physical Exam   Constitutional: She appears well-developed and well-nourished. HENT:   Right Ear: External ear normal.   Left Ear: External ear normal.   Poor dentition, mild gingival bleeding; moderately swollen L periorbital area with ecchymosis, L conjunctiva with hemorrhage and chemosis, moderate proptosis, PERRL at 4 mm b/l, EOMi. Eyes: EOM are normal. Pupils are equal, round, and reactive to light. See above     Neck: Normal range of motion. Neck supple. Cardiovascular: Normal rate, regular rhythm and normal heart sounds. No murmur heard. Pulmonary/Chest: Effort normal and breath sounds normal. No respiratory distress. Abdominal: Soft. Bowel sounds are normal. She exhibits no distension. There is no tenderness. There is no rebound. Musculoskeletal: Normal range of motion. She exhibits no edema. Neurological: She is alert. No cranial nerve deficit. She exhibits normal muscle tone. Coordination normal.   Skin: Skin is warm and dry. No rash noted. Psychiatric: She has a normal mood and affect. Her behavior is normal.   Nursing note and vitals reviewed. [facility]  ED Course       Procedures    I spoke with Dr. Gerardo Kovacs at 7892. Discussed case, H&P, and CT head findings. He recommend additional, dedicated CT orbit with and without contrast. He reports that vision is likely permanently impaired based on his exam this morning. Can dc if INR therapeutic. I spoke with Dr. Miley Curtis, admitting hospitalist, at 8588. Requesting FFP and Vitamin K. Requests further ophtho recommendations. I spoke with Dr. Gerardo Kovacs at 5349. Reviewed new results (INR >11). He agrees with correction and admission to hospital. No emergent indication for surgery at this time.

## 2017-06-06 ENCOUNTER — APPOINTMENT (OUTPATIENT)
Dept: GENERAL RADIOLOGY | Age: 67
DRG: 125 | End: 2017-06-06
Attending: INTERNAL MEDICINE
Payer: SUBSIDIZED

## 2017-06-06 LAB
ANION GAP BLD CALC-SCNC: 10 MMOL/L (ref 5–15)
BASOPHILS # BLD AUTO: 0 K/UL (ref 0–0.1)
BASOPHILS # BLD: 0 % (ref 0–1)
BLD PROD TYP BPU: NORMAL
BLD PROD TYP BPU: NORMAL
BPU ID: NORMAL
BPU ID: NORMAL
BUN SERPL-MCNC: 14 MG/DL (ref 6–20)
BUN/CREAT SERPL: 17 (ref 12–20)
CALCIUM SERPL-MCNC: 9.3 MG/DL (ref 8.5–10.1)
CHLORIDE SERPL-SCNC: 94 MMOL/L (ref 97–108)
CO2 SERPL-SCNC: 30 MMOL/L (ref 21–32)
CREAT SERPL-MCNC: 0.83 MG/DL (ref 0.55–1.02)
DIFFERENTIAL METHOD BLD: ABNORMAL
EOSINOPHIL # BLD: 0 K/UL (ref 0–0.4)
EOSINOPHIL NFR BLD: 0 % (ref 0–7)
ERYTHROCYTE [DISTWIDTH] IN BLOOD BY AUTOMATED COUNT: 14.4 % (ref 11.5–14.5)
EST. AVERAGE GLUCOSE BLD GHB EST-MCNC: 148 MG/DL
GLUCOSE BLD STRIP.AUTO-MCNC: 182 MG/DL (ref 65–100)
GLUCOSE BLD STRIP.AUTO-MCNC: 186 MG/DL (ref 65–100)
GLUCOSE BLD STRIP.AUTO-MCNC: 205 MG/DL (ref 65–100)
GLUCOSE SERPL-MCNC: 153 MG/DL (ref 65–100)
HBA1C MFR BLD: 6.8 % (ref 4.2–6.3)
HCT VFR BLD AUTO: 22.6 % (ref 35–47)
HGB BLD-MCNC: 7.5 G/DL (ref 11.5–16)
INR PPP: 1.2 (ref 0.9–1.1)
LYMPHOCYTES # BLD AUTO: 9 % (ref 12–49)
LYMPHOCYTES # BLD: 0.7 K/UL (ref 0.8–3.5)
MCH RBC QN AUTO: 26.9 PG (ref 26–34)
MCHC RBC AUTO-ENTMCNC: 33.2 G/DL (ref 30–36.5)
MCV RBC AUTO: 81 FL (ref 80–99)
MONOCYTES # BLD: 0.2 K/UL (ref 0–1)
MONOCYTES NFR BLD AUTO: 3 % (ref 5–13)
NEUTS SEG # BLD: 6.9 K/UL (ref 1.8–8)
NEUTS SEG NFR BLD AUTO: 88 % (ref 32–75)
PLATELET # BLD AUTO: 286 K/UL (ref 150–400)
POTASSIUM SERPL-SCNC: 3.3 MMOL/L (ref 3.5–5.1)
PROTHROMBIN TIME: 12 SEC (ref 9–11.1)
RBC # BLD AUTO: 2.79 M/UL (ref 3.8–5.2)
RBC MORPH BLD: ABNORMAL
SERVICE CMNT-IMP: ABNORMAL
SODIUM SERPL-SCNC: 134 MMOL/L (ref 136–145)
STATUS OF UNIT,%ST: NORMAL
STATUS OF UNIT,%ST: NORMAL
UNIT DIVISION, %UDIV: 0
UNIT DIVISION, %UDIV: 0
WBC # BLD AUTO: 7.8 K/UL (ref 3.6–11)

## 2017-06-06 PROCEDURE — 74011250637 HC RX REV CODE- 250/637: Performed by: INTERNAL MEDICINE

## 2017-06-06 PROCEDURE — 74011250637 HC RX REV CODE- 250/637: Performed by: NURSE PRACTITIONER

## 2017-06-06 PROCEDURE — 74011250636 HC RX REV CODE- 250/636: Performed by: INTERNAL MEDICINE

## 2017-06-06 PROCEDURE — 74011636637 HC RX REV CODE- 636/637: Performed by: INTERNAL MEDICINE

## 2017-06-06 PROCEDURE — 36430 TRANSFUSION BLD/BLD COMPNT: CPT | Performed by: NURSE PRACTITIONER

## 2017-06-06 PROCEDURE — 36415 COLL VENOUS BLD VENIPUNCTURE: CPT | Performed by: INTERNAL MEDICINE

## 2017-06-06 PROCEDURE — 85025 COMPLETE CBC W/AUTO DIFF WBC: CPT | Performed by: INTERNAL MEDICINE

## 2017-06-06 PROCEDURE — 83036 HEMOGLOBIN GLYCOSYLATED A1C: CPT | Performed by: INTERNAL MEDICINE

## 2017-06-06 PROCEDURE — 77030029131 HC ADMN ST IV BLD N DEHP ICUM -B

## 2017-06-06 PROCEDURE — 85610 PROTHROMBIN TIME: CPT | Performed by: INTERNAL MEDICINE

## 2017-06-06 PROCEDURE — 71010 XR CHEST PORT: CPT

## 2017-06-06 PROCEDURE — 80048 BASIC METABOLIC PNL TOTAL CA: CPT | Performed by: INTERNAL MEDICINE

## 2017-06-06 PROCEDURE — 74011000250 HC RX REV CODE- 250: Performed by: OPHTHALMOLOGY

## 2017-06-06 PROCEDURE — P9059 PLASMA, FRZ BETWEEN 8-24HOUR: HCPCS | Performed by: INTERNAL MEDICINE

## 2017-06-06 PROCEDURE — 65610000006 HC RM INTENSIVE CARE

## 2017-06-06 PROCEDURE — 74011250637 HC RX REV CODE- 250/637: Performed by: HOSPITALIST

## 2017-06-06 PROCEDURE — 82962 GLUCOSE BLOOD TEST: CPT

## 2017-06-06 PROCEDURE — 74011250636 HC RX REV CODE- 250/636: Performed by: HOSPITALIST

## 2017-06-06 RX ORDER — BRIMONIDINE TARTRATE 2 MG/ML
1 SOLUTION/ DROPS OPHTHALMIC EVERY 8 HOURS
Status: DISCONTINUED | OUTPATIENT
Start: 2017-06-06 | End: 2017-06-08 | Stop reason: HOSPADM

## 2017-06-06 RX ORDER — ACETAMINOPHEN 325 MG/1
650 TABLET ORAL
Status: DISCONTINUED | OUTPATIENT
Start: 2017-06-06 | End: 2017-06-08 | Stop reason: HOSPADM

## 2017-06-06 RX ORDER — DORZOLAMIDE HYDROCHLORIDE AND TIMOLOL MALEATE 20; 5 MG/ML; MG/ML
1 SOLUTION/ DROPS OPHTHALMIC 3 TIMES DAILY
Status: DISCONTINUED | OUTPATIENT
Start: 2017-06-06 | End: 2017-06-08 | Stop reason: HOSPADM

## 2017-06-06 RX ORDER — DEXTROSE 50 % IN WATER (D50W) INTRAVENOUS SYRINGE
12.5-25 AS NEEDED
Status: DISCONTINUED | OUTPATIENT
Start: 2017-06-06 | End: 2017-06-06

## 2017-06-06 RX ORDER — SODIUM CHLORIDE 0.9 % (FLUSH) 0.9 %
SYRINGE (ML) INJECTION
Status: COMPLETED
Start: 2017-06-06 | End: 2017-06-06

## 2017-06-06 RX ORDER — INSULIN LISPRO 100 [IU]/ML
INJECTION, SOLUTION INTRAVENOUS; SUBCUTANEOUS
Status: DISCONTINUED | OUTPATIENT
Start: 2017-06-06 | End: 2017-06-08 | Stop reason: HOSPADM

## 2017-06-06 RX ORDER — MAGNESIUM SULFATE 100 %
4 CRYSTALS MISCELLANEOUS AS NEEDED
Status: DISCONTINUED | OUTPATIENT
Start: 2017-06-06 | End: 2017-06-08 | Stop reason: HOSPADM

## 2017-06-06 RX ORDER — FUROSEMIDE 10 MG/ML
20 INJECTION INTRAMUSCULAR; INTRAVENOUS 3 TIMES DAILY
Status: DISCONTINUED | OUTPATIENT
Start: 2017-06-06 | End: 2017-06-08 | Stop reason: HOSPADM

## 2017-06-06 RX ORDER — POTASSIUM CHLORIDE 750 MG/1
40 TABLET, FILM COATED, EXTENDED RELEASE ORAL DAILY
Status: COMPLETED | OUTPATIENT
Start: 2017-06-06 | End: 2017-06-08

## 2017-06-06 RX ORDER — SODIUM CHLORIDE 0.9 % (FLUSH) 0.9 %
5 SYRINGE (ML) INJECTION EVERY 8 HOURS
Status: DISCONTINUED | OUTPATIENT
Start: 2017-06-06 | End: 2017-06-08 | Stop reason: HOSPADM

## 2017-06-06 RX ORDER — FUROSEMIDE 10 MG/ML
20 INJECTION INTRAMUSCULAR; INTRAVENOUS ONCE
Status: COMPLETED | OUTPATIENT
Start: 2017-06-06 | End: 2017-06-06

## 2017-06-06 RX ORDER — POTASSIUM CHLORIDE 750 MG/1
30 TABLET, FILM COATED, EXTENDED RELEASE ORAL
Status: COMPLETED | OUTPATIENT
Start: 2017-06-06 | End: 2017-06-06

## 2017-06-06 RX ADMIN — FUROSEMIDE 20 MG: 10 INJECTION, SOLUTION INTRAMUSCULAR; INTRAVENOUS at 17:47

## 2017-06-06 RX ADMIN — Medication 5 ML: at 08:16

## 2017-06-06 RX ADMIN — POTASSIUM CHLORIDE 40 MEQ: 750 TABLET, FILM COATED, EXTENDED RELEASE ORAL at 17:53

## 2017-06-06 RX ADMIN — INSULIN LISPRO 2 UNITS: 100 INJECTION, SOLUTION INTRAVENOUS; SUBCUTANEOUS at 16:09

## 2017-06-06 RX ADMIN — METHYLPREDNISOLONE 4 MG: 4 TABLET ORAL at 01:59

## 2017-06-06 RX ADMIN — HYDROCHLOROTHIAZIDE: 25 TABLET ORAL at 08:58

## 2017-06-06 RX ADMIN — DORZOLAMIDE HYDROCHLORIDE AND TIMOLOL MALEATE 1 DROP: 20; 5 SOLUTION/ DROPS OPHTHALMIC at 21:39

## 2017-06-06 RX ADMIN — METHYLPREDNISOLONE 4 MG: 4 TABLET ORAL at 16:09

## 2017-06-06 RX ADMIN — METHYLPREDNISOLONE 4 MG: 4 TABLET ORAL at 08:58

## 2017-06-06 RX ADMIN — METHYLPREDNISOLONE 4 MG: 4 TABLET ORAL at 12:06

## 2017-06-06 RX ADMIN — ACETAMINOPHEN 650 MG: 325 TABLET, FILM COATED ORAL at 02:59

## 2017-06-06 RX ADMIN — POTASSIUM CHLORIDE 30 MEQ: 750 TABLET, FILM COATED, EXTENDED RELEASE ORAL at 13:53

## 2017-06-06 RX ADMIN — Medication 5 ML: at 13:59

## 2017-06-06 RX ADMIN — FUROSEMIDE 20 MG: 10 INJECTION, SOLUTION INTRAMUSCULAR; INTRAVENOUS at 13:55

## 2017-06-06 RX ADMIN — BRIMONIDINE TARTRATE 1 DROP: 2 SOLUTION OPHTHALMIC at 14:01

## 2017-06-06 RX ADMIN — INSULIN LISPRO 2 UNITS: 100 INJECTION, SOLUTION INTRAVENOUS; SUBCUTANEOUS at 22:03

## 2017-06-06 RX ADMIN — METHYLPREDNISOLONE 4 MG: 4 TABLET ORAL at 02:03

## 2017-06-06 RX ADMIN — FUROSEMIDE 20 MG: 10 INJECTION, SOLUTION INTRAMUSCULAR; INTRAVENOUS at 22:04

## 2017-06-06 RX ADMIN — LOVASTATIN 40 MG: 20 TABLET ORAL at 20:50

## 2017-06-06 RX ADMIN — ACETAMINOPHEN 650 MG: 325 TABLET, FILM COATED ORAL at 22:35

## 2017-06-06 RX ADMIN — INSULIN LISPRO 205 UNITS: 100 INJECTION, SOLUTION INTRAVENOUS; SUBCUTANEOUS at 12:06

## 2017-06-06 RX ADMIN — BRIMONIDINE TARTRATE 1 DROP: 2 SOLUTION OPHTHALMIC at 21:38

## 2017-06-06 RX ADMIN — Medication 10 ML: at 08:16

## 2017-06-06 RX ADMIN — METHYLPREDNISOLONE 8 MG: 4 TABLET ORAL at 01:59

## 2017-06-06 RX ADMIN — SODIUM CHLORIDE 75 ML/HR: 900 INJECTION, SOLUTION INTRAVENOUS at 01:07

## 2017-06-06 RX ADMIN — Medication 5 ML: at 21:39

## 2017-06-06 RX ADMIN — DORZOLAMIDE HYDROCHLORIDE AND TIMOLOL MALEATE 1 DROP: 20; 5 SOLUTION/ DROPS OPHTHALMIC at 16:03

## 2017-06-06 NOTE — PROGRESS NOTES
Care Management: reviewed chart and demographics. Patient's PMH: HTN, DM, Syncope, CVA, osteo right knee. This admission: coagulopathy with occular hemorhage. Patient and daughter rent a townhouse together. Patient obtains [facility]. Support system is daughter whom patient lives with. Plan today is for a duplex of lower extremities. Present insurance is [employer] / [place]. 4-29-17 / Home Health offered information on Care Card and explained to patient. Daughter states it was filled out. Care Management Interventions  PCP Verified by CM: Yes  Last Visit to PCP: 06/05/17  Mode of Transport at Discharge: Other (see comment) (lives with daughter and she drives)  Transition of Care Consult (CM Consult): Other (at this time disposition is to go home with daughter)  Leon Mosquera: No  Discharge Durable Medical Equipment:  (at this time no.)  Physical Therapy Consult: No  Occupational Therapy Consult: No  Speech Therapy Consult: No  Current Support Network:  Other (patient and daughter rent a townhouse together)  Confirm Follow Up Transport:  (daughter / private auto)  Plan discussed with Pt/Family/Caregiver: Yes (daughter)  Discharge Location  Discharge Placement: Home (disposition today is home with daughter)      Aguilar Hoffman RN  BSN CM

## 2017-06-06 NOTE — PROGRESS NOTES
0100: Received patient from the ED for routine progression of care. Upon assessment, patient alert and cooperative. VSS. Language barrier. Daughter at bedside translating. Patient oriented x 3 and follows commands. Denies any pain at this time. Patient unable to see out of L eye.    0129: 1 of 2 units of FFP infusing. 0147: Paged Hospitalist on call to clarify orders for Medrol. 0150Erica Decker NP called back and informed her about Medrol order. She stated she is going to call pharmacy to verify. 0153: Erica Decker called me and informed me that patient is to receive a total of 24 mg of Medrol at this time. 5724: Patient complaining about a posterior headache rated 5/10. Paged Karla Man NP for orders for pain medication. [address] [place] [address] with Karla Man and orders received for PRN tylenol. 0256: 2nd unit of FFP infusing. 7932: FFP completed. No transfusion reaction suspected. Shift Summary: Patient received 2 units of FFP after arrival to ICU. Patient alert and oriented x 3. Follows commands. Mild headache and PRN tylenol given. CAM negative. VSS. L eye appears to have decreased in swelling but redness and bruising still noted. Patient placed on 2LNC this morning for oxygen saturation at 88-89% on room air.

## 2017-06-06 NOTE — H&P
[address]   [address] [address], [address]   HISTORY AND PHYSICAL       Name:  ASHOK Kearns   MR#:  [id number]   :  1950   Account #:  [de-identified]        Date of Adm:  2017       PRIMARY CARE PHYSICIAN: Unknown. CHIEF COMPLAINT: Left eye swelling and ecchymosis. HISTORY OF PRESENT ILLNESS: The patient is a 80-year-old   female with past medical history of hypertension, diabetes   mellitus, also had a recent right knee replacement. The patient   presented to the emergency room after she had just visited her   ophthalmologist's with chief complaint as above. According to the   Patients daughter who was interpreting for patient who mainly speaks [place], . Her mother has complained of an uncomfortable feeling in her left eye for   the past 4 days and the swelling gradually increased and today   appeared worse than usual. They therefore went to the   ophthalmologist where she was seen within a short time. Following the   visit, the ophthalmologist referred her to the ED to get a CT scan of the   head and a primary care physician for followup. While at the   primary care physician's office, the patient's INR was checked and it   was found to be 11. The patient was therefore sent to the ED for   further evaluation. In the ED, although the patient was awake and   conscious, she appeared confused and the patient's eyes   were clearly swollen shut. The patient did not appear to be in any   pain although she has not answered questions regarding her review of   systems. She denied any trauma of the eye. PAST MEDICAL HISTORY: Includes   1. Hypertension. 2. Diabetes mellitus. 3. Arthritis. ALLERGIES: SHE HAS NO KNOWN DRUG ALLERGIES. CURRENT MEDICATIONS: Include   1. -- once a day. 2. Zestoretic. 3. _____ once a day. 4. Mevacor 40 mg a day. 5. Metformin --- 1 tab twice a day. 4. Warfarin 5 mg daily. 5. Ultram 50 mg a day.     SOCIAL HISTORY: Does not smoke or drink alcohol. CODE STATUS: FULL CODE. FAMILY HISTORY: Not pertinent for heart disease or neoplasm. REVIEW OF SYSTEMS: As described above in HPI . PHYSICAL EXAMINATION   VITAL SIGNS: Her blood pressure was 168/81, heart rate 99,   temperature 98.5, respiratory rate 16, O2 saturation 100% on room air. GENERAL: The patient is lying in bed in no apparent distress. HEENT: Normocephalic, atraumatic. On examination of the left eye, the   left eye was swollen shut. There was echymosis and hematoma around the left eye,   It was difficult to peel the patient's eyelid back to examine the eyeball. The right eye was normal.   CHEST: Clear bilaterally to auscultation. CARDIOVASCULAR: S1, S2, regular rate and rhythm. ABDOMEN: Nontender, nondistended. Bowel sounds present. CENTRAL NERVOUS SYSTEM: Alert, oriented to person, place and   time. EXTREMITIES: No pedal edema. Pedal pulses present. LABORATORY DATA: Showed essentially normal CBC, hemoglobin   8.6. Coagulation panel: INR 11.0,  -- 130. Chemistries pertinent   for sodium 130, potassium 3.8, chloride 93, creatinine 1.1. Head CT of   the orbit showed left proptosis with suspicion of left globe hemorrhage. And possible retinal detachment (see full report). IMPRESSION: The patient is a 80-year-old female presents with chief cmplaints as above. 1. Left orbit hemorrhage. 2. Possible retinal detachment. 3. Right Knee sweling. 4. Coumadin coagulopathy    PLAN: The patient is admitted to telemetry floor. 1. Follow up with her hemorrhage. This appears to be an   ophthalmological emergency. The patient apparently had seen   ophthalmology prior to going to the emergency department, and the   emergency department has consulted ophthalmologist, Dr. Iris Baldwin to   evaluate the patient. According to the emergency department physician,   Dr. Iris Baldwin notes that he has already seen the patient and has nothing else to add.  I   also spoke with Dr. Enedina Ham who described that he had nothing much   to add to the patient's care. Recommended continuing on steroids for a   few more days. Dr. Enedina Ham also recommended some eyedrops, however,   he suggested we call the on-call ophthalmologist for further orders and   dosing of her eyedrops. I have placed a STAT consult to Ophthalmology. I will follow up   with their recommendations. 2. Coumadin coagulopathy The patient's INR was 11 and I have ordered several   pints of fresh frozen plasma as well as a dose of vitamin K. We   will watch her INR and PT closely and adjust therapy accordingly. 3. Home medications as above. On behalf of [facility]'s Hospitalist Group, I thank you for   the opportunity of taking care of this patient.         Dipesh Casey MD      II / RA   D:  06/06/2017   00:13   T:  06/06/2017   10:05   Job #:  [id number]

## 2017-06-06 NOTE — CDMP QUERY
Patient is noted to have a BMI of  31.88. Please clarify if this patient is: The [address] [place] [address] has issued a statement indicating that, \"Individuals who are overweight, obese, or morbidly obese are at an increased risk for certain medical conditions when compared to persons of normal weight. Therefore, these conditions are always clinically significant and reportable when documented by the provider. \"    =>Obesity (BMI of 30-39.9)  => Morbid Obesity  (BMI 40 or greater)  =>Overweight (BMI 25-29.9)  => Other weight status (specify  status)  => Unable to determine        Presentation: ht 5' 4\" Wt 185lbs BMI 31.88          Please clarify and document your clinical opinion in the progress notes and discharge summary, including the definitive and or presumptive diagnosis, (suspected or probable), related to the above clinical findings. Please include clinical findings supporting your diagnosis.    Thank Maximilian Agudelo RN  [address]     [phone]

## 2017-06-06 NOTE — H&P
[facility] [id number]  - Lt orbit hemorrhage  - Coumadin induced coagulopathy

## 2017-06-06 NOTE — ED NOTES
Bedside and Verbal shift change report given to Ady Finley RN (oncoming nurse) by Lisandra Hager RN (offgoing nurse). Report included the following information SBAR, ED Summary, MAR and Recent Results.

## 2017-06-06 NOTE — PROGRESS NOTES
Hospitalist Progress Note  Shelia Martin MD  Office: [phone]        Date of Service:  2017  NAME:  Melonie Hager  :  1950  MRN:  [id number]      Admission Summary: This 78year old [place] speaking lady was admitted for left intraocular hemorrhage. She was on warfarin for DVT that followed recent knee surgery. Interval history / Subjective:   She speaks Norwegian,Dr Avi Mcmillan helped with interpretation.  provided most of the hsitory  She could not see out of the left eye. No other complaint. Assessment & Plan:   Left occular hemorrhage with visual loss while on warfarin  -Per ,she might have had long standing vision problem from glaucoma, cataract.  -Warfarin reversed. INR from 11 to 1.2  -Ophthalmology eval pending,it was noted she was send from ophthalmology office.  -On steroids. Continue Cosopt and Alphagan  -CT head negative for intracranial abnormality    Acquired coagulopathy due to warfarin  -Warfarin stopped. s/p reversal     Right leg DVT dx 2017  -Warfarin stopped. -Recheck doppler, if positive for DVT,need IVC filter    Pul edema, no CHF  -Lasix,supplemental oxygen. Dm on oral hypoglycemics  -POC accuchecks and Humalog SS coverage    HTN, continued on home regimen    Hyponatremia, improved  Hypokalemia: replace  Chronic anemia: for out patient work up  Obese  Body mass index is 32.41 kg/(m^2). Code status: full  DVT prophylaxis: scd  Care Plan discussed with: Patient/Family and Nurse  Disposition: Home w/Family        Hospital Problems  Date Reviewed: 2016          Codes Class Noted POA    Coagulopathy ([facility] [address] ICD-10-CM: D68.9  ICD-9-CM: 286.9  2017 Unknown        Ocular hemorrhage ICD-10-CM: B96.672  ICD-9-CM: 360.43  2017 Unknown                Review of Systems:   Pertinent items are noted in HPI. Vital Signs:    Last 24hrs VS reviewed since prior progress note.  Most recent are:  Visit Vitals    /75 (BP 1 Location: Left arm, BP Patient Position: At rest)    Pulse 98    Temp 98.6 °F (37 °C)    Resp (!) 31    Wt 84.3 kg (185 lb 13.6 oz)    SpO2 100%    Breastfeeding No    BMI 31.88 kg/m2         Intake/Output Summary (Last 24 hours) at 06/06/17 1655  Last data filed at 06/06/17 1600   Gross per 24 hour   Intake          1689.17 ml   Output             1100 ml   Net           589.17 ml        Physical Examination:             Constitutional:  No acute distress, cooperative   ENT:  periorbital ecchymosis and injected conjunctiva on the left eye and subconjunctival hemorrhage on the right eye. Resp:  Scattered crackles. CV:  Regular rhythm, normal rate, no murmurs, gallops, rubs    GI:  Soft, non distended, non tender. normoactive bowel sounds, no hepatosplenomegaly     Musculoskeletal:  No edema    Neurologic:  Moves all extremities. AAOx3, CN II-XII reviewed            Data Review:    Review and/or order of clinical lab test  Review and/or order of tests in the radiology section of CPT  Review and/or order of tests in the medicine section of CPT      Labs:     Recent Labs      06/06/17 0630 06/05/17   1556   WBC  7.8  8.4   HGB  7.5*  8.6*   HCT  22.6*  26.1*   PLT  286  338     Recent Labs      06/06/17 0630 06/05/17   1556   NA  134*  130*   K  3.3*  3.8   CL  94*  93*   CO2  30  26   BUN  14  20   CREA  0.83  1.10*   GLU  153*  134*   CA  9.3  9.8     Recent Labs      06/05/17   1556   SGOT  20   ALT  21   AP  74   TBILI  0.9   TP  7.9   ALB  3.6   GLOB  4.3*     Recent Labs      06/06/17 0630 06/05/17   1556   INR  1.2*  >11.0*   PTP  12.0*  >120.0*   APTT   --   >130.0*      No results for input(s): FE, TIBC, PSAT, FERR in the last 72 hours. No results found for: FOL, RBCF   No results for input(s): PH, PCO2, PO2 in the last 72 hours. No results for input(s): CPK, CKNDX, CHANCE in the last 72 hours.     No lab exists for component: ARIK  Lab Results   Component Value Date/Time    Cholesterol, total 89 06/19/2016 05:18 AM    HDL Cholesterol 40 06/19/2016 05:18 AM    LDL, calculated 39 06/19/2016 05:18 AM    Triglyceride 50 06/19/2016 05:18 AM    CHOL/HDL Ratio 2.2 06/19/2016 05:18 AM     Lab Results   Component Value Date/Time    Glucose (POC) 186 06/06/2017 03:59 PM    Glucose (POC) 205 06/06/2017 12:01 PM    Glucose (POC) 147 06/05/2017 10:38 PM    Glucose (POC) 162 04/07/2017 04:04 PM    Glucose (POC) 148 04/07/2017 12:09 PM     Lab Results   Component Value Date/Time    Color YELLOW/STRAW 03/29/2017 02:03 PM    Appearance CLEAR 03/29/2017 02:03 PM    Specific gravity 1.016 03/29/2017 02:03 PM    pH (UA) 6.0 03/29/2017 02:03 PM    Protein NEGATIVE  03/29/2017 02:03 PM    Glucose NEGATIVE  03/29/2017 02:03 PM    Ketone NEGATIVE  03/29/2017 02:03 PM    Bilirubin NEGATIVE  03/29/2017 02:03 PM    Urobilinogen 0.2 03/29/2017 02:03 PM    Nitrites NEGATIVE  03/29/2017 02:03 PM    Leukocyte Esterase NEGATIVE  03/29/2017 02:03 PM    Epithelial cells FEW 03/29/2017 02:03 PM    Bacteria NEGATIVE  03/29/2017 02:03 PM    WBC 0-4 03/29/2017 02:03 PM    RBC 0-5 03/29/2017 02:03 PM         Medications Reviewed:     Current Facility-Administered Medications   Medication Dose Route Frequency    acetaminophen (TYLENOL) tablet 650 mg  650 mg Oral Q6H PRN    SALINE PERIPHERAL FLUSH Q8H soln 5 mL  5 mL InterCATHeter Q8H    glucose chewable tablet 16 g  4 Tab Oral PRN    glucagon (GLUCAGEN) injection 1 mg  1 mg IntraMUSCular PRN    insulin lispro (HUMALOG) injection   SubCUTAneous AC&HS    [START ON 6/7/2017] SITagliptin/metFORMIN (JANUMET) 50/500 MG   Oral BID WITH MEALS    dextrose 10 % infusion 125-250 mL  125-250 mL IntraVENous PRN    dorzolamide-timolol (COSOPT) 22.3-6.8 mg/mL ophthalmic solution 1 Drop  1 Drop Left Eye TID    brimonidine (ALPHAGAN) 0.2 % ophthalmic solution 1 Drop  1 Drop Left Eye Q8H    0.9% sodium chloride infusion 250 mL  250 mL IntraVENous PRN    lovastatin (MEVACOR) tablet 40 mg  40 mg Oral QHS    0.9% sodium chloride infusion  50 mL/hr IntraVENous CONTINUOUS    0.9% sodium chloride infusion 250 mL  250 mL IntraVENous PRN    lisinopril/hydroCHLOROthiazide(PRINZIDE/ZESTORETIC) 20/25 mg   Oral DAILY    [START ON 6/7/2017] methylPREDNISolone (MEDROL) tablet 8 mg  8 mg Oral ONCE    Followed by   Laurent Ruelas ON 6/7/2017] methylPREDNISolone (MEDROL) tablet 4 mg  4 mg Oral QID WITH MEALS    Followed by   Laurent Ruelas ON 6/8/2017] methylPREDNISolone (MEDROL) tablet 4 mg  4 mg Oral TID WITH MEALS    Followed by   Laurent Ruelas ON 6/9/2017] methylPREDNISolone (MEDROL) tablet 4 mg  4 mg Oral ACB/HS    Followed by   Laurent Ruelas ON 6/10/2017] methylPREDNISolone (MEDROL) tablet 4 mg  4 mg Oral ACB     ______________________________________________________________________  EXPECTED LENGTH OF STAY: 2d 14h  ACTUAL LENGTH OF STAY:          1                 Shelia Martin MD

## 2017-06-06 NOTE — PROGRESS NOTES
Rounded on [facility] patients and provided Anointing of the Sick at request of family.     Mathew Chua

## 2017-06-06 NOTE — ED NOTES
Received bedside report in SBAR format, updated on [facility], recent results and plan of care from [address]. Assumed care of patient.

## 2017-06-06 NOTE — ROUTINE PROCESS
Bedside and Verbal shift change report given to Torrie Marrero (oncoming nurse) by Danni Small (offgoing nurse). Report included the following information SBAR, Kardex, Intake/Output, MAR, Recent Results, Cardiac Rhythm NSR and Alarm Parameters .

## 2017-06-06 NOTE — PROGRESS NOTES
0730: Bedside and Verbal shift change report given to Elli Solorio, VIVIANA (oncoming nurse) by Mariam Brush RN (offgoing nurse). Report included the following information SBAR, Kardex, Intake/Output, MAR, Accordion and Recent Results. [address] [facility] [place] with pharmacy regarding janumet; pt had IV contrast yesterday and should not receive metformin for 48hrs post. Paged hospitalist to modify order. 1000: Informed Dr. Antonio Mathew of patient's elevated RR, HR in mid teens, and O2 sats of 89% on room air. Concerning d/t history of DVT. Will await orders for patient. For now, patient placed back on nasal cannula O2, with sats recovering to 100%. No labored breathing noted, but shallow and tachypneic. Breath sounds clear but diminished. 1145: Dr. Ma Shadow office paged to determine if they will be in to see patient today. Spoke with Dr. Ma Shadow nurse, patient should be on eye drops, verbal orders received. They will not be in to see patient, there is no surgical intervention or specific care requirements. 1200: SCDs off until DVT ruled out. 1600: No changes to previous assessment. 1930: Bedside and Verbal shift change report given to DNS:Net Leona, RN (oncoming nurse) by Anne Kirby RN (offgoing nurse). Report included the following information SBAR, Kardex, Intake/Output, MAR, Accordion and Recent Results.

## 2017-06-06 NOTE — CONSULTS
PULMONARY/CRITICAL CARE/SLEEP MEDICINE    Initial Physician Consultation Note    Name: Edmund Driver   : 1950   MRN: [id number]   Date: 2017      Subjective:   Consult Note: 2017     This patient has been seen and evaluated as a new patient admitted to ICU. Medical records and data reviewed. Patient is a 78 y.o. female who presented to the hospital with complaints of eye pain and swelling and has been admitted to ICU with coumadin coagulopathy and L orbital hemorrhage. She was on coumadin for DVT associate with knee surgery a few weeks ago. She was noted to be hypoxic in the ICU and CXR shows interstitial edema. INR was 11 on admission and clinical suspicion for VTE is low, coumadin is being held. LE dopplers will be checked to assess for DVT and if present, consideration to IVC filter may have to be given while she remains off anticoagulant therapy. Hb is low and there is no overt evidence of blood loss except for noted orbital hemorrhage. CT showed proptosis and consistent findings.  There is no history of trauma      Assessment:   L orbital globe hemorrhage  Coumadin coagulopathy- reversed  Hypoxic resp insufficiency  Interstitial edema with known LV diastolic dysfunction  H/O DVT S/P TKR  Anemia  Other problems per chart      Recommendations:     Monitor Hb  Lasix prn  Ophthal to see  O2  Incentive spirometer  LE dopplers- follow and assess need for IVC filter  Off coumadin  On steroids  Glycemic control  Medical problems per hospitalist  Should be able to transfer out of ICU later today/tomorrow  We will be available to help as needed      Active Problem List:     Problem List  Date Reviewed: 2016          Codes Class    Coagulopathy ([facility] [address] ICD-10-CM: D68.9  ICD-9-CM: 286.9         Ocular hemorrhage ICD-10-CM: H44.819  ICD-9-CM: 360.43         Osteoarthritis of right knee ICD-10-CM: M17.11  ICD-9-CM: 715.96         Diabetes mellitus type 2, controlled ([facility] [address] (Chronic) ICD-10-CM: E11.9  ICD-9-CM: 250.00         Syncope ICD-10-CM: R55  ICD-9-CM: 780.2         CVA, old, hemiparesis ([facility] [address] (Chronic) ICD-10-CM: O13.481  ICD-9-CM: 438.20               Past Medical History:      has a past medical history of Arthritis; Diabetes ([facility] [address] and Hypertension. Past Surgical History:      has no past surgical history on file. Home Medications:     Prior to Admission medications    Medication Sig Start Date End Date Taking? Authorizing Provider   warfarin (COUMADIN) 5 mg tablet Take 7.5 mg by mouth daily. Indications: DEEP VEIN THROMBOSIS PREVENTION   Yes Historical Provider   lovastatin (MEVACOR) 40 mg tablet Take 40 mg by mouth daily. Yes Historical Provider   sitaGLIPtin-metFORMIN (JANUMET)  mg per tablet Take 1 Tab by mouth two (2) times daily (with meals). Yes Historical Provider   lisinopril-hydrochlorothiazide (PRINZIDE, ZESTORETIC) 20-25 mg per tablet Take 1 Tab by mouth daily. Yes Phys MD Rochelle   traMADol (ULTRAM) 50 mg tablet Take 1 Tab by mouth every six (6) hours as needed for Pain. Max Daily Amount: 200 mg. 17   Andre Nieto MD       Allergies/Social/Family History:     No Known Allergies   Social History   Substance Use Topics    Smoking status: Never Smoker    Smokeless tobacco: Never Used    Alcohol use No      Family History   Problem Relation Age of Onset    Anesth Problems Neg Hx         Review of Systems:     Review of systems not obtained due to patient factors.     Objective:   Vital Signs:  Visit Vitals    /77 (BP 1 Location: Left arm, BP Patient Position: At rest)    Pulse (!) 103    Temp 99.1 °F (37.3 °C)    Resp 24    Wt 84.3 kg (185 lb 13.6 oz)    SpO2 100%    Breastfeeding No    BMI 31.88 kg/m2    O2 Flow Rate (L/min): 2 l/min O2 Device: Nasal cannula Temp (24hrs), Av.3 °F (36.8 °C), Min:97.7 °F (36.5 °C), Max:99.9 °F (37.7 °C)           Intake/Output:     Intake/Output Summary (Last 24 hours) at 17 1231  Last data filed at 06/06/17 1100   Gross per 24 hour   Intake             1400 ml   Output              500 ml   Net              900 ml       Physical Exam:   General:  Alert, cooperative, no distress, appears stated age. Head:  Normocephalic, without obvious abnormality, atraumatic. Eyes:  L ecchymosis   Neck: Supple, symmetrical, trachea midline, no adenopathy, thyroid: no enlargment/tenderness/nodules, no carotid bruit and no JVD. Lungs:   Clear to auscultation bilaterally. Chest wall:  No tenderness or deformity. Heart:  Regular rate and rhythm, S1, S2 normal, no murmur, click, rub or gallop. Abdomen:   Soft, non-tender. Bowel sounds normal. No masses,  No organomegaly. Extremities: Extremities normal, atraumatic, no cyanosis or edema. Pulses: 2+ and symmetric all extremities. Skin: Skin color, texture, turgor normal. No rashes or lesions   Neurologic: Grossly nonfocal         LABS AND  DATA: Personally reviewed  Recent Labs      06/06/17 0630 06/05/17   1556   WBC  7.8  8.4   HGB  7.5*  8.6*   HCT  22.6*  26.1*   PLT  286  338     Recent Labs      06/06/17 0630  06/05/17   1556   NA  134*  130*   K  3.3*  3.8   CL  94*  93*   CO2  30  26   BUN  14  20   CREA  0.83  1.10*   GLU  153*  134*   CA  9.3  9.8     Recent Labs      06/05/17   1556   SGOT  20   AP  74   TP  7.9   ALB  3.6   GLOB  4.3*     Recent Labs      06/06/17 0630 06/05/17   1556   INR  1.2*  >11.0*   PTP  12.0*  >120.0*   APTT   --   >130.0*      No results for input(s): PHI, PCO2I, PO2I, FIO2I in the last 72 hours. No results for input(s): CPK, CKMB, TROIQ, BNPP in the last 72 hours. MEDS: Reviewed    Chest X-Ray: personally reviewed and report checked    EKG/ Tele      Thank you for allowing me to participate in this patient's care.     MD Olesya Huizar MD, [facility]  [facility]

## 2017-06-06 NOTE — ROUTINE PROCESS
TRANSFER - IN REPORT:    Verbal report received from [facility]) on [place] iain Berry Angry  being received from ED(unit) for routine progression of care      Report consisted of patients Situation, Background, Assessment and   Recommendations(SBAR). Information from the following report(s) SBAR, Kardex, ED Summary, Intake/Output, MAR, Recent Results, Cardiac Rhythm NSR and Alarm Parameters  was reviewed with the receiving nurse. Opportunity for questions and clarification was provided. Assessment completed upon patients arrival to unit and care assumed.

## 2017-06-06 NOTE — PROGRESS NOTES
Attended interdisciplinary rounds in ICU. : Rev. Cristina Cerna.  Sonido Crowe; [facility]; to contact [phone] [address] Simmons call: [phone]

## 2017-06-06 NOTE — ED NOTES
Spoke to Carson Bhat NP, regarding patient's next does of 2 units of FFP's. Ordered to give another 2 units when ready.

## 2017-06-06 NOTE — ROUTINE PROCESS
Patient left department with RN on cardiac monitor for transportation to inpatient bed. Patient's VS at the time of transfer were /81, 100 on RA, denies pain at this time, 98.5 orally, HR 97 rhythm on the monitor. Patient was alert and oriented x 3 and denies further needs at time of transfer. Patient's family went home prior to transfer to floor. TRANSFER - OUT REPORT:    Verbal report given to VIVIANA Ruelas(name) on [facility]  being transferred to ICU(unit) for routine progression of care       Report consisted of patients Situation, Background, Assessment and   Recommendations(SBAR). Information from the following report(s) SBAR, Kardex, ED Summary, [facility] and Recent Results was reviewed with the receiving nurse. Opportunity for questions and clarification was provided.

## 2017-06-06 NOTE — PROGRESS NOTES
Spiritual Care Assessment/Progress Notes    Mecca Aden [id number]  [id number]    1950  78 y.o.  female    Patient Telephone Number: [phone] (home)   Baptist Affiliation: Episcopalian   Language: Western Ching   Extended Emergency Contact Information  Primary Emergency Contact: [address] [facility]  Mobile Phone: [phone]  Relation: Child   Patient Active Problem List    Diagnosis Date Noted    Coagulopathy ([facility] 06/05/2017    Ocular hemorrhage 06/05/2017    Osteoarthritis of right knee 04/03/2017    Diabetes mellitus type 2, controlled ([facility] 06/18/2016    Syncope 06/18/2016    [facility] 06/18/2016        Date: 6/6/2017       Level of Baptist/Spiritual Activity:  []         Involved in cecil tradition/spiritual practice    []         Not involved in cecil tradition/spiritual practice  []         Spiritually oriented    []         Claims no spiritual orientation    []         seeking spiritual identity  []         Feels alienated from Jainism practice/tradition  []         Feels angry about Jainism practice/tradition  [x]         Spirituality/Jainism tradition IS a resource for coping at this time.   []         Not able to assess due to medical condition    Services Provided Today:  []         crisis intervention    []         reading Scriptures  [x]         spiritual assessment    []         prayer  [x]         empathic listening/emotional support  []         rites and rituals (cite in comments)  []         life review     []         Jainism support  []         theological development   []         advocacy  []         ethical dialog     []         blessing  []         bereavement support    [x]         support to family  []         anticipatory grief support   []         help with AMD  []         spiritual guidance    []         meditation      Spiritual Care Needs  [x]         Emotional Support  [x]         Spiritual/Baptist Care  [] Loss/Adjustment  []         Advocacy/Referral                /Ethics  []         No needs expressed at               this time  []         Other: (note in               comments)  [address]  []         Follow up visits with               pt/family  []         Provide materials  []         Schedule sacraments  []         Contact Community               Clergy  [x]         Follow up as needed  []         Other: (note in               comments)     Comments: Visited Ms Harleen Long in [place] for initial spiritual assessment. Ms Harleen Long appeared to be sleeping; her daughter was present at time of visit. Provided compassionate presence as patient's daughter stated that she felt her mother was doing much better than she had been yesterday. Daughter shared she had no specific needs at that time. She shared that her mother was [address] and used to be a member of the [facility] [facility] [employer]. Daughter stated that she would be very appreciative of prayers on their behalf. Assured daughter of prayers on behalf of patient and family and of 25 hour  availability for support. Plan: Chaplains will continue to be available for patient/family support as needed/able. : Rev. Guerrero Teran.  Ish Farrar; [facility], to contact [phone] [address] call: [phone]

## 2017-06-07 LAB
ANION GAP BLD CALC-SCNC: 7 MMOL/L (ref 5–15)
BLD PROD TYP BPU: NORMAL
BLD PROD TYP BPU: NORMAL
BPU ID: NORMAL
BPU ID: NORMAL
BUN SERPL-MCNC: 15 MG/DL (ref 6–20)
BUN/CREAT SERPL: 16 (ref 12–20)
CALCIUM SERPL-MCNC: 9 MG/DL (ref 8.5–10.1)
CHLORIDE SERPL-SCNC: 98 MMOL/L (ref 97–108)
CO2 SERPL-SCNC: 31 MMOL/L (ref 21–32)
CREAT SERPL-MCNC: 0.95 MG/DL (ref 0.55–1.02)
GLUCOSE BLD STRIP.AUTO-MCNC: 148 MG/DL (ref 65–100)
GLUCOSE BLD STRIP.AUTO-MCNC: 184 MG/DL (ref 65–100)
GLUCOSE BLD STRIP.AUTO-MCNC: 216 MG/DL (ref 65–100)
GLUCOSE BLD STRIP.AUTO-MCNC: 255 MG/DL (ref 65–100)
GLUCOSE SERPL-MCNC: 132 MG/DL (ref 65–100)
POTASSIUM SERPL-SCNC: 4 MMOL/L (ref 3.5–5.1)
SERVICE CMNT-IMP: ABNORMAL
SODIUM SERPL-SCNC: 136 MMOL/L (ref 136–145)
STATUS OF UNIT,%ST: NORMAL
STATUS OF UNIT,%ST: NORMAL
UNIT DIVISION, %UDIV: 0
UNIT DIVISION, %UDIV: 0

## 2017-06-07 PROCEDURE — 65270000029 HC RM PRIVATE

## 2017-06-07 PROCEDURE — 80048 BASIC METABOLIC PNL TOTAL CA: CPT | Performed by: HOSPITALIST

## 2017-06-07 PROCEDURE — 74011250637 HC RX REV CODE- 250/637: Performed by: INTERNAL MEDICINE

## 2017-06-07 PROCEDURE — 36415 COLL VENOUS BLD VENIPUNCTURE: CPT | Performed by: HOSPITALIST

## 2017-06-07 PROCEDURE — 74011250637 HC RX REV CODE- 250/637: Performed by: HOSPITALIST

## 2017-06-07 PROCEDURE — 93970 EXTREMITY STUDY: CPT

## 2017-06-07 PROCEDURE — 74011250636 HC RX REV CODE- 250/636: Performed by: HOSPITALIST

## 2017-06-07 PROCEDURE — 82962 GLUCOSE BLOOD TEST: CPT

## 2017-06-07 PROCEDURE — 74011250636 HC RX REV CODE- 250/636: Performed by: INTERNAL MEDICINE

## 2017-06-07 PROCEDURE — 74011636637 HC RX REV CODE- 636/637: Performed by: INTERNAL MEDICINE

## 2017-06-07 PROCEDURE — 77010033678 HC OXYGEN DAILY

## 2017-06-07 RX ADMIN — Medication 5 ML: at 22:38

## 2017-06-07 RX ADMIN — BRIMONIDINE TARTRATE 1 DROP: 2 SOLUTION OPHTHALMIC at 14:17

## 2017-06-07 RX ADMIN — METHYLPREDNISOLONE 4 MG: 4 TABLET ORAL at 12:06

## 2017-06-07 RX ADMIN — INSULIN LISPRO 2 UNITS: 100 INJECTION, SOLUTION INTRAVENOUS; SUBCUTANEOUS at 06:45

## 2017-06-07 RX ADMIN — METHYLPREDNISOLONE 4 MG: 4 TABLET ORAL at 22:39

## 2017-06-07 RX ADMIN — METHYLPREDNISOLONE 4 MG: 4 TABLET ORAL at 17:06

## 2017-06-07 RX ADMIN — INSULIN LISPRO 2 UNITS: 100 INJECTION, SOLUTION INTRAVENOUS; SUBCUTANEOUS at 12:12

## 2017-06-07 RX ADMIN — DORZOLAMIDE HYDROCHLORIDE AND TIMOLOL MALEATE 1 DROP: 20; 5 SOLUTION/ DROPS OPHTHALMIC at 08:49

## 2017-06-07 RX ADMIN — METHYLPREDNISOLONE 8 MG: 4 TABLET ORAL at 08:46

## 2017-06-07 RX ADMIN — Medication 5 ML: at 14:17

## 2017-06-07 RX ADMIN — LOVASTATIN 40 MG: 20 TABLET ORAL at 22:39

## 2017-06-07 RX ADMIN — FUROSEMIDE 20 MG: 10 INJECTION, SOLUTION INTRAMUSCULAR; INTRAVENOUS at 22:40

## 2017-06-07 RX ADMIN — INSULIN LISPRO 3 UNITS: 100 INJECTION, SOLUTION INTRAVENOUS; SUBCUTANEOUS at 17:04

## 2017-06-07 RX ADMIN — HYDROCHLOROTHIAZIDE: 25 TABLET ORAL at 08:46

## 2017-06-07 RX ADMIN — Medication 5 ML: at 05:53

## 2017-06-07 RX ADMIN — DORZOLAMIDE HYDROCHLORIDE AND TIMOLOL MALEATE 1 DROP: 20; 5 SOLUTION/ DROPS OPHTHALMIC at 22:40

## 2017-06-07 RX ADMIN — INSULIN LISPRO 3 UNITS: 100 INJECTION, SOLUTION INTRAVENOUS; SUBCUTANEOUS at 22:40

## 2017-06-07 RX ADMIN — SODIUM CHLORIDE 50 ML/HR: 900 INJECTION, SOLUTION INTRAVENOUS at 07:20

## 2017-06-07 RX ADMIN — BRIMONIDINE TARTRATE 1 DROP: 2 SOLUTION OPHTHALMIC at 22:40

## 2017-06-07 RX ADMIN — FUROSEMIDE 20 MG: 10 INJECTION, SOLUTION INTRAMUSCULAR; INTRAVENOUS at 08:50

## 2017-06-07 RX ADMIN — DORZOLAMIDE HYDROCHLORIDE AND TIMOLOL MALEATE 1 DROP: 20; 5 SOLUTION/ DROPS OPHTHALMIC at 17:06

## 2017-06-07 RX ADMIN — FUROSEMIDE 20 MG: 10 INJECTION, SOLUTION INTRAMUSCULAR; INTRAVENOUS at 17:04

## 2017-06-07 RX ADMIN — BRIMONIDINE TARTRATE 1 DROP: 2 SOLUTION OPHTHALMIC at 05:53

## 2017-06-07 RX ADMIN — POTASSIUM CHLORIDE 40 MEQ: 750 TABLET, FILM COATED, EXTENDED RELEASE ORAL at 08:46

## 2017-06-07 NOTE — ROUTINE PROCESS
1930: Bedside, Verbal and Written shift change report given to Chaya Garza RN (oncoming nurse) by Weston Rodney RN (offgoing nurse). Report included the following information SBAR, Kardex, ED Summary, Procedure Summary, Intake/Output, MAR, Recent Results, Med Rec Status and Cardiac Rhythm SR/ST.       0730: Bedside, Verbal and Written shift change report given to Scott Cardozo RN (oncoming nurse) by Chaya Garza RN (offgoing nurse). Report included the following information SBAR, Kardex, ED Summary, Intake/Output, MAR, Recent Results, Med Rec Status and Cardiac Rhythm SR/ST.

## 2017-06-07 NOTE — PROCEDURES
[address]  *** FINAL REPORT ***    Name: Candis See  MRN: [id number]    Inpatient  : 23 Mar 1950  HIS Order #: [id number]  [address] [facility] Visit #: [id number]  Date: 2017    TYPE OF TEST: Peripheral Venous Testing    REASON FOR TEST  hx of DVT    Right Leg:-  Deep venous thrombosis:           No  Superficial venous thrombosis:    No  Deep venous insufficiency:        Not examined  Superficial venous insufficiency: Not examined    Left Leg:-  Deep venous thrombosis:           No  Superficial venous thrombosis:    No  Deep venous insufficiency:        Not examined  Superficial venous insufficiency: Not examined      INTERPRETATION/FINDINGS  PROCEDURE:  Color duplex ultrasound imaging of lower extremity veins. FINDINGS:       Right: The common femoral, deep femoral, femoral, popliteal,  posterior tibial, peroneal, and great saphenous are patent and without   evidence of thrombus;  each is fully compressible and there is no  narrowing of the flow channel on color Doppler imaging. There is  limited visualization of the calf veins secondary to small vessel  size. Phasic flow is observed in the common femoral vein. Left:   The common femoral, deep femoral, femoral, popliteal,  posterior tibial, peroneal, and great saphenous are patent and without   evidence of thrombus;  each is fully compressible and there is no  narrowing of the flow channel on color Doppler imaging. There is  limited visualization of the calf veins secondary to small vessel  size. Phasic flow is observed in the common femoral vein. IMPRESSION:  No evidence of right or left lower extremity vein  thrombosis where visualized. ADDITIONAL COMMENTS    I have personally reviewed the data relevant to the interpretation of  this  study.     TECHNOLOGIST: Mari Khan RVT  Signed: 2017 11:52 AM    PHYSICIAN: Tito Gregg MD  Signed: 2017 07:53 AM

## 2017-06-07 NOTE — PROGRESS NOTES
Primary Nurse Brianna Flowers, RN and Briseida Baez, RN performed a dual skin assessment on this patient No impairment noted  Tim score is 20

## 2017-06-07 NOTE — PROGRESS NOTES
PULMONARY/CRITICAL CARE/SLEEP MEDICINE    Physician Consultation Note    Name: Meghan Corbett   : 1950   MRN: [id number]   Date: 2017      Subjective:   6  Better, on RA and comfortable. Dopplers negative    Consult Note:     This patient has been seen and evaluated as a new patient admitted to ICU. Medical records and data reviewed. Patient is a 78 y.o. female who presented to the hospital with complaints of eye pain and swelling and has been admitted to ICU with coumadin coagulopathy and L orbital hemorrhage. She was on coumadin for DVT associate with knee surgery a few weeks ago. She was noted to be hypoxic in the ICU and CXR shows interstitial edema. INR was 11 on admission and clinical suspicion for VTE is low, coumadin is being held. LE dopplers will be checked to assess for DVT and if present, consideration to IVC filter may have to be given while she remains off anticoagulant therapy. Hb is low and there is no overt evidence of blood loss except for noted orbital hemorrhage. CT showed proptosis and consistent findings.  There is no history of trauma      Assessment:   L orbital globe hemorrhage  Coumadin coagulopathy- reversed  Hypoxic resp insufficiency  Interstitial edema with known LV diastolic dysfunction  H/O DVT S/P TKR  Anemia  Other problems per chart      Recommendations:     Lasix prn  Incentive spirometer  LE dopplers- follow and assess need for IVC filter- no DVT noted  Off coumadin  On steroids  Glycemic control  Mobilize  Medical problems per hospitalist  Transfer out of ICU  We will be available to help as needed      Active Problem List:     Problem List  Date Reviewed: 2016          Codes Class    Coagulopathy ([facility] ICD-10-CM: D68.9  ICD-9-CM: 286.9         * (Principal)Ocular hemorrhage ICD-10-CM: H44.819  ICD-9-CM: 360.43         Osteoarthritis of right knee ICD-10-CM: M17.11  ICD-9-CM: 715.96         Diabetes mellitus type 2, controlled ([facility] (Chronic) ICD-10-CM: E11.9  ICD-9-CM: 250.00         Syncope ICD-10-CM: R55  ICD-9-CM: 780.2         CVA, old, hemiparesis ([facility] (Chronic) ICD-10-CM: J83.348  ICD-9-CM: 438.20               Past Medical History:      has a past medical history of Arthritis; Diabetes ([facility] and Hypertension. Past Surgical History:      has no past surgical history on file. Home Medications:     Prior to Admission medications    Medication Sig Start Date End Date Taking? Authorizing Provider   warfarin (COUMADIN) 5 mg tablet Take 7.5 mg by mouth daily. Indications: DEEP VEIN THROMBOSIS PREVENTION   Yes Historical Provider   lovastatin (MEVACOR) 40 mg tablet Take 40 mg by mouth daily. Yes Historical Provider   sitaGLIPtin-metFORMIN (JANUMET)  mg per tablet Take 1 Tab by mouth two (2) times daily (with meals). Yes Historical Provider   lisinopril-hydrochlorothiazide (PRINZIDE, ZESTORETIC) 20-25 mg per tablet Take 1 Tab by mouth daily. Yes Phys Other, MD   traMADol (ULTRAM) 50 mg tablet Take 1 Tab by mouth every six (6) hours as needed for Pain. Max Daily Amount: 200 mg. 17   Sharon Mcdaniel MD       Allergies/Social/Family History:     No Known Allergies   Social History   Substance Use Topics    Smoking status: Never Smoker    Smokeless tobacco: Never Used    Alcohol use No      Family History   Problem Relation Age of Onset    Anesth Problems Neg Hx         Review of Systems:     Review of systems not obtained due to patient factors.     Objective:   Vital Signs:  Visit Vitals    /77    Pulse 83    Temp 99 °F (37.2 °C)    Resp 18    Ht 5' 3.39\" (1.61 m)    Wt 85.7 kg (188 lb 15 oz)    SpO2 100%    Breastfeeding No    BMI 33.06 kg/m2    O2 Flow Rate (L/min): 1 l/min O2 Device: Room air Temp (24hrs), Av.5 °F (36.9 °C), Min:97.9 °F (36.6 °C), Max:99 °F (37.2 °C)           Intake/Output:     Intake/Output Summary (Last 24 hours) at 17 1407  Last data filed at 17 0800   Gross per 24 hour   Intake 900 ml   Output             1975 ml   Net            -1075 ml       Physical Exam:   General:  Alert, cooperative, no distress, appears stated age. Head:  Normocephalic, without obvious abnormality, atraumatic. Eyes:  L ecchymosis   Neck: Supple, symmetrical, trachea midline, no adenopathy, thyroid: no enlargment/tenderness/nodules, no carotid bruit and no JVD. Lungs:   Clear to auscultation bilaterally. Chest wall:  No tenderness or deformity. Heart:  Regular rate and rhythm, S1, S2 normal, no murmur, click, rub or gallop. Abdomen:   Soft, non-tender. Bowel sounds normal. No masses,  No organomegaly. Extremities: Extremities normal, atraumatic, no cyanosis or edema. Pulses: 2+ and symmetric all extremities. Skin: Skin color, texture, turgor normal. No rashes or lesions   Neurologic: Grossly nonfocal         LABS AND  DATA: Personally reviewed  Recent Labs      06/06/17   0630  06/05/17   1556   WBC  7.8  8.4   HGB  7.5*  8.6*   HCT  22.6*  26.1*   PLT  286  338     Recent Labs      06/07/17   0531  06/06/17   0630   NA  136  134*   K  4.0  3.3*   CL  98  94*   CO2  31  30   BUN  15  14   CREA  0.95  0.83   GLU  132*  153*   CA  9.0  9.3     Recent Labs      06/05/17   1556   SGOT  20   AP  74   TP  7.9   ALB  3.6   GLOB  4.3*     Recent Labs      06/06/17   0630  06/05/17   1556   INR  1.2*  >11.0*   PTP  12.0*  >120.0*   APTT   --   >130.0*      No results for input(s): PHI, PCO2I, PO2I, FIO2I in the last 72 hours. No results for input(s): CPK, CKMB, TROIQ, BNPP in the last 72 hours. MEDS: Reviewed    Chest X-Ray: personally reviewed and report checked    EKG/ Tele      Thank you for allowing me to participate in this patient's care.     MD Jeet Vaelrio MD, [facility]  [facility]

## 2017-06-07 NOTE — PROGRESS NOTES
I visited with this patient to discuss her level of interest in completing an [address] Directive, per the referral of Cristhian Wong. She is not English-speaking (she speaks Western Ching), and I conversed mostly with her daughter, who is English-speaking. I explained the document and its purpose and inquired about her level of interest. Her daughter helped with the overview that I provided as far as living will and MPOA. Due to the language barrier, I suggested that the patient's daughter review the document and discuss it with her mother and then page a  to follow up if needed. The daughter agreed with this plan. If the patient decides that she wants to fill it out, we will arrange for an official  to [address] remains available in the meantime if needed. Rev. Kiana Jerry M.Div, [facility]

## 2017-06-07 NOTE — PROGRESS NOTES
2100: [address] [facility] [employer] phone used for interpretation to complete assessment. Patient speaks Ewe and [place]. A&Ox4, L eye swollen shut, bruised with serous drainage. When L eye open for assessment, sclera red and inflamed, iris opaque, and pupil fixed. R eye is round, equal, and reactive 3. VSS, afebrile. 2235: PRN tylenol given per orders for patient complaint of eye pain. Shift Summary:  Uneventful shift. Blue phone utilized for interpretation. Patient A&Ox4, JACOBS, follows commands. NS @ 50 ml/hr infusing in peripheral IV. PRN tylenol given 1x per orders for pain control. L eye swollen and bruised. VSS, afebrile. CAM ICU negative. Estimated sleep: 8 hours.

## 2017-06-07 NOTE — PROGRESS NOTES
Problem: General Medical Care Plan  Goal: *Optimal pain control at patients stated goal  Outcome: Progressing Towards Goal  PRN tylenol given per orders for pain.

## 2017-06-07 NOTE — PROGRESS NOTES
0730- Bedside and Verbal shift change report given to Leonela Toledo (oncoming nurse) by Van Tsai RN (offgoing nurse). Report included the following information SBAR, Kardex, ED Summary, Intake/Output, MAR, Recent Results and Cardiac Rhythm NSR.   1430- TRANSFER - OUT REPORT:    Verbal report given to [address] (name) on Meccatan Coreas  being transferred to (unit) for routine progression of care       Report consisted of patients Situation, Background, Assessment and   Recommendations(SBAR). Information from the following report(s) SBAR, Kardex, ED Summary, Intake/Output, MAR, Recent Results and Cardiac Rhythm NSR was reviewed with the receiving nurse. Lines:   Peripheral IV 06/05/17 Right Arm (Active)   Site Assessment Clean, dry, & intact 6/7/2017 12:00 PM   Phlebitis Assessment 0 6/7/2017 12:00 PM   Infiltration Assessment 0 6/7/2017 12:00 PM   Dressing Status Clean, dry, & intact 6/7/2017 12:00 PM   Dressing Type Transparent;Tape 6/7/2017 12:00 PM   Hub Color/Line Status Pink;Capped;Flushed 6/7/2017 12:00 PM   Action Taken Open ports on tubing capped 6/7/2017  4:00 AM   Alcohol Cap Used Yes 6/7/2017 12:00 PM        Opportunity for questions and clarification was provided.       Patient transported with:   Registered Nurse  Tech

## 2017-06-07 NOTE — PROGRESS NOTES
Hospitalist Progress Note  Suhail Tripathi MD  Office: [phone]        Date of Service:  2017  NAME:  Juliene Severs  :  1950  MRN:  [id number]      Admission Summary: This 78year old Western [place]ing speaking lady was admitted for left intraocular hemorrhage. She was on warfarin for DVT that followed recent knee surgery. Interval history / Subjective:   Patient seen in room 612. Daughter presented and helped with translation. Patient has had visual impairment to the right eye suspected to be due to retinal detachment. Visiton has improved but with upper field cut. She had had vision issues both eyes but the left eye was the better one until this recent incident. Assessment & Plan:   Left occular hemorrhage with visual loss while on warfarin  -Per ,she might have had long standing vision problem from glaucoma, cataract.  -Warfarin reversed. INR from 11 to 1.2  -Ophthalmology eval pending,it was noted she was send from ophthalmology office. There is notation by admission or ER that ophthalmologist stated they don't have much to add. If they are unable to see her inpatient,she needs to seem them in office ASAP after discharge.  -On steroids. Continue Cosopt and Alphagan  -CT head negative for intracranial abnormality    Acquired coagulopathy due to warfarin  -Warfarin stopped. s/p reversal       Right leg DVT dx 2017,provoked,after knee surgery  -Warfarin stopped. -Repeat doppler ,negative for DVT. Pul edema, no CHF  -Lasix,supplemental oxygen.  -Improved. Dm on oral hypoglycemics  -POC accuchecks and Humalog SS coverage    HTN, continued on home regimen    Hyponatremia, improved  Hypokalemia: replace  Chronic anemia: for out patient work up  Obese  Body mass index is 33.06 kg/(m^2). Recent right knee replacement. Knee stiff from immobility  -Encouraged to get up sit in chair and move as much with help from family or staff  -Physical therapy eval.She had HHPT after her knee surgery            Code status: full  DVT prophylaxis: scd  Care Plan discussed with: Patient/Family and Nurse  Disposition: Home w/Family in 1-2 days        Hospital Problems  Date Reviewed: 6/18/2016          Codes Class Noted POA    Coagulopathy ([facility] ICD-10-CM: D68.9  ICD-9-CM: 286.9  6/5/2017 Unknown        * (Principal)Ocular hemorrhage ICD-10-CM: Z17.451  ICD-9-CM: 360.43  6/5/2017 Unknown                Review of Systems:   Pertinent items are noted in HPI. Vital Signs:    Last 24hrs VS reviewed since prior progress note. Most recent are:  Visit Vitals    /86 (BP 1 Location: Left arm, BP Patient Position: At rest)    Pulse 82    Temp 98.4 °F (36.9 °C)    Resp 22    Ht 5' 3.39\" (1.61 m)    Wt 85.7 kg (188 lb 15 oz)    SpO2 98%    Breastfeeding No    BMI 33.06 kg/m2         Intake/Output Summary (Last 24 hours) at 06/07/17 1738  Last data filed at 06/07/17 1200   Gross per 24 hour   Intake              950 ml   Output             1725 ml   Net             -775 ml        Physical Examination:             Constitutional:  No acute distress, cooperative   ENT:  periorbital ecchymosis and injected conjunctiva on the left eye and subconjunctival hemorrhage on the right eye. Resp:  Scattered crackles. CV:  Regular rhythm, normal rate, no murmurs, gallops, rubs    GI:  Soft, non distended, non tender. normoactive bowel sounds, no hepatosplenomegaly     Musculoskeletal:  No edema. Right knee surgical scar. Limited ROM,stiff. Neurologic:  Moves all extremities.   AAOx3, CN II-XII reviewed            Data Review:    Review and/or order of clinical lab test  Review and/or order of tests in the radiology section of CPT  Review and/or order of tests in the medicine section of CPT      Labs:     Recent Labs      06/06/17   0630  06/05/17   1556   WBC  7.8  8.4   HGB  7.5*  8.6*   HCT  22.6*  26.1*   PLT  286  338     Recent Labs 06/07/17   0531  06/06/17   0630  06/05/17   1556   NA  136  134*  130*   K  4.0  3.3*  3.8   CL  98  94*  93*   CO2  31  30  26   BUN  15  14  20   CREA  0.95  0.83  1.10*   GLU  132*  153*  134*   CA  9.0  9.3  9.8     Recent Labs      06/05/17   1556   SGOT  20   ALT  21   AP  74   TBILI  0.9   TP  7.9   ALB  3.6   GLOB  4.3*     Recent Labs      06/06/17   0630  06/05/17   1556   INR  1.2*  >11.0*   PTP  12.0*  >120.0*   APTT   --   >130.0*      No results for input(s): FE, TIBC, PSAT, FERR in the last 72 hours. No results found for: FOL, RBCF   No results for input(s): PH, PCO2, PO2 in the last 72 hours. No results for input(s): CPK, CKNDX, TROIQ in the last 72 hours.     No lab exists for component: CPKMB  Lab Results   Component Value Date/Time    Cholesterol, total 89 06/19/2016 05:18 AM    HDL Cholesterol 40 06/19/2016 05:18 AM    LDL, calculated 39 06/19/2016 05:18 AM    Triglyceride 50 06/19/2016 05:18 AM    CHOL/HDL Ratio 2.2 06/19/2016 05:18 AM     Lab Results   Component Value Date/Time    Glucose (POC) 216 06/07/2017 04:18 PM    Glucose (POC) 184 06/07/2017 12:04 PM    Glucose (POC) 148 06/07/2017 06:40 AM    Glucose (POC) 182 06/06/2017 09:38 PM    Glucose (POC) 186 06/06/2017 03:59 PM     Lab Results   Component Value Date/Time    Color YELLOW/STRAW 03/29/2017 02:03 PM    Appearance CLEAR 03/29/2017 02:03 PM    Specific gravity 1.016 03/29/2017 02:03 PM    pH (UA) 6.0 03/29/2017 02:03 PM    Protein NEGATIVE  03/29/2017 02:03 PM    Glucose NEGATIVE  03/29/2017 02:03 PM    Ketone NEGATIVE  03/29/2017 02:03 PM    Bilirubin NEGATIVE  03/29/2017 02:03 PM    Urobilinogen 0.2 03/29/2017 02:03 PM    Nitrites NEGATIVE  03/29/2017 02:03 PM    Leukocyte Esterase NEGATIVE  03/29/2017 02:03 PM    Epithelial cells FEW 03/29/2017 02:03 PM    Bacteria NEGATIVE  03/29/2017 02:03 PM    WBC 0-4 03/29/2017 02:03 PM    RBC 0-5 03/29/2017 02:03 PM         Medications Reviewed:     Current Facility-Administered Medications   Medication Dose Route Frequency    acetaminophen (TYLENOL) tablet 650 mg  650 mg Oral Q6H PRN    SALINE PERIPHERAL FLUSH Q8H soln 5 mL  5 mL InterCATHeter Q8H    glucose chewable tablet 16 g  4 Tab Oral PRN    glucagon (GLUCAGEN) injection 1 mg  1 mg IntraMUSCular PRN    insulin lispro (HUMALOG) injection   SubCUTAneous AC&HS    SITagliptin/metFORMIN (JANUMET) 50/500 MG   Oral BID WITH MEALS    dextrose 10 % infusion 125-250 mL  125-250 mL IntraVENous PRN    dorzolamide-timolol (COSOPT) 22.3-6.8 mg/mL ophthalmic solution 1 Drop  1 Drop Left Eye TID    brimonidine (ALPHAGAN) 0.2 % ophthalmic solution 1 Drop  1 Drop Left Eye Q8H    furosemide (LASIX) injection 20 mg  20 mg IntraVENous TID    potassium chloride SR (KLOR-CON 10) tablet 40 mEq  40 mEq Oral DAILY    0.9% sodium chloride infusion 250 mL  250 mL IntraVENous PRN    lovastatin (MEVACOR) tablet 40 mg  40 mg Oral QHS    0.9% sodium chloride infusion 250 mL  250 mL IntraVENous PRN    lisinopril/hydroCHLOROthiazide(PRINZIDE/ZESTORETIC) 20/25 mg   Oral DAILY    methylPREDNISolone (MEDROL) tablet 4 mg  4 mg Oral QID WITH MEALS    Followed by   Lili Armijo ON 6/8/2017] methylPREDNISolone (MEDROL) tablet 4 mg  4 mg Oral TID WITH MEALS    Followed by   Lili Armijo ON 6/9/2017] methylPREDNISolone (MEDROL) tablet 4 mg  4 mg Oral ACB/HS    Followed by   Lili Armijo ON 6/10/2017] methylPREDNISolone (MEDROL) tablet 4 mg  4 mg Oral ACB     ______________________________________________________________________  EXPECTED LENGTH OF STAY: 2d 14h  ACTUAL LENGTH OF STAY:          2                 Suhail Tripathi MD

## 2017-06-08 VITALS
RESPIRATION RATE: 16 BRPM | SYSTOLIC BLOOD PRESSURE: 118 MMHG | TEMPERATURE: 98 F | BODY MASS INDEX: 28.44 KG/M2 | HEART RATE: 87 BPM | HEIGHT: 63 IN | WEIGHT: 160.5 LBS | DIASTOLIC BLOOD PRESSURE: 75 MMHG | OXYGEN SATURATION: 100 %

## 2017-06-08 PROBLEM — D68.9 COAGULOPATHY (HCC): Status: RESOLVED | Noted: 2017-06-05 | Resolved: 2017-06-08

## 2017-06-08 PROBLEM — H57.89: Status: RESOLVED | Noted: 2017-06-05 | Resolved: 2017-06-08

## 2017-06-08 LAB
ERYTHROCYTE [DISTWIDTH] IN BLOOD BY AUTOMATED COUNT: 14.4 % (ref 11.5–14.5)
GLUCOSE BLD STRIP.AUTO-MCNC: 153 MG/DL (ref 65–100)
GLUCOSE BLD STRIP.AUTO-MCNC: 179 MG/DL (ref 65–100)
HCT VFR BLD AUTO: 22.6 % (ref 35–47)
HGB BLD-MCNC: 7.3 G/DL (ref 11.5–16)
MCH RBC QN AUTO: 26.3 PG (ref 26–34)
MCHC RBC AUTO-ENTMCNC: 32.3 G/DL (ref 30–36.5)
MCV RBC AUTO: 81.3 FL (ref 80–99)
PLATELET # BLD AUTO: 355 K/UL (ref 150–400)
RBC # BLD AUTO: 2.78 M/UL (ref 3.8–5.2)
SERVICE CMNT-IMP: ABNORMAL
SERVICE CMNT-IMP: ABNORMAL
WBC # BLD AUTO: 8.1 K/UL (ref 3.6–11)

## 2017-06-08 PROCEDURE — 74011250636 HC RX REV CODE- 250/636: Performed by: INTERNAL MEDICINE

## 2017-06-08 PROCEDURE — 74011250637 HC RX REV CODE- 250/637: Performed by: INTERNAL MEDICINE

## 2017-06-08 PROCEDURE — 82962 GLUCOSE BLOOD TEST: CPT

## 2017-06-08 PROCEDURE — 97161 PT EVAL LOW COMPLEX 20 MIN: CPT

## 2017-06-08 PROCEDURE — 97530 THERAPEUTIC ACTIVITIES: CPT

## 2017-06-08 PROCEDURE — 36415 COLL VENOUS BLD VENIPUNCTURE: CPT | Performed by: HOSPITALIST

## 2017-06-08 PROCEDURE — 74011250636 HC RX REV CODE- 250/636: Performed by: HOSPITALIST

## 2017-06-08 PROCEDURE — 97165 OT EVAL LOW COMPLEX 30 MIN: CPT

## 2017-06-08 PROCEDURE — 74011636637 HC RX REV CODE- 636/637: Performed by: INTERNAL MEDICINE

## 2017-06-08 PROCEDURE — 74011250637 HC RX REV CODE- 250/637: Performed by: HOSPITALIST

## 2017-06-08 PROCEDURE — 85027 COMPLETE CBC AUTOMATED: CPT | Performed by: HOSPITALIST

## 2017-06-08 PROCEDURE — 74011250637 HC RX REV CODE- 250/637: Performed by: NURSE PRACTITIONER

## 2017-06-08 RX ORDER — DORZOLAMIDE HYDROCHLORIDE AND TIMOLOL MALEATE 20; 5 MG/ML; MG/ML
1 SOLUTION/ DROPS OPHTHALMIC 3 TIMES DAILY
Qty: 10 ML | Refills: 0 | Status: SHIPPED | OUTPATIENT
Start: 2017-06-08

## 2017-06-08 RX ORDER — BRIMONIDINE TARTRATE 2 MG/ML
1 SOLUTION/ DROPS OPHTHALMIC EVERY 8 HOURS
Qty: 15 ML | Refills: 0 | Status: SHIPPED | OUTPATIENT
Start: 2017-06-08

## 2017-06-08 RX ORDER — METHYLPREDNISOLONE 4 MG/1
TABLET ORAL
Qty: 6 TAB | Refills: 0 | Status: SHIPPED | OUTPATIENT
Start: 2017-06-08

## 2017-06-08 RX ADMIN — INSULIN LISPRO 2 UNITS: 100 INJECTION, SOLUTION INTRAVENOUS; SUBCUTANEOUS at 11:39

## 2017-06-08 RX ADMIN — METHYLPREDNISOLONE 4 MG: 4 TABLET ORAL at 12:00

## 2017-06-08 RX ADMIN — METFORMIN HYDROCHLORIDE: 500 TABLET, FILM COATED ORAL at 07:27

## 2017-06-08 RX ADMIN — BRIMONIDINE TARTRATE 1 DROP: 2 SOLUTION OPHTHALMIC at 06:51

## 2017-06-08 RX ADMIN — BRIMONIDINE TARTRATE 1 DROP: 2 SOLUTION OPHTHALMIC at 13:42

## 2017-06-08 RX ADMIN — ACETAMINOPHEN 650 MG: 325 TABLET, FILM COATED ORAL at 13:42

## 2017-06-08 RX ADMIN — FUROSEMIDE 20 MG: 10 INJECTION, SOLUTION INTRAMUSCULAR; INTRAVENOUS at 15:15

## 2017-06-08 RX ADMIN — ACETAMINOPHEN 650 MG: 325 TABLET, FILM COATED ORAL at 07:34

## 2017-06-08 RX ADMIN — FUROSEMIDE 20 MG: 10 INJECTION, SOLUTION INTRAMUSCULAR; INTRAVENOUS at 09:35

## 2017-06-08 RX ADMIN — DORZOLAMIDE HYDROCHLORIDE AND TIMOLOL MALEATE 1 DROP: 20; 5 SOLUTION/ DROPS OPHTHALMIC at 09:36

## 2017-06-08 RX ADMIN — INSULIN LISPRO 2 UNITS: 100 INJECTION, SOLUTION INTRAVENOUS; SUBCUTANEOUS at 07:27

## 2017-06-08 RX ADMIN — DORZOLAMIDE HYDROCHLORIDE AND TIMOLOL MALEATE 1 DROP: 20; 5 SOLUTION/ DROPS OPHTHALMIC at 15:15

## 2017-06-08 RX ADMIN — Medication 5 ML: at 06:51

## 2017-06-08 RX ADMIN — METHYLPREDNISOLONE 4 MG: 4 TABLET ORAL at 07:27

## 2017-06-08 RX ADMIN — HYDROCHLOROTHIAZIDE: 25 TABLET ORAL at 09:35

## 2017-06-08 RX ADMIN — Medication 5 ML: at 13:41

## 2017-06-08 RX ADMIN — POTASSIUM CHLORIDE 40 MEQ: 750 TABLET, FILM COATED, EXTENDED RELEASE ORAL at 09:35

## 2017-06-08 NOTE — DISCHARGE INSTRUCTIONS
Discharge Instructions       PATIENT ID: Zarina Mina  MRN: [id number]   YOB: 1950    DATE OF ADMISSION: 6/5/2017  3:54 PM    DATE OF DISCHARGE: 6/8/2017    PRIMARY CARE PROVIDER: Zahra Guthrie MD     ATTENDING PHYSICIAN: Noelle Thapa MD  DISCHARGING PROVIDER: Noelle Thapa MD    To contact this individual call [phone] and ask the  to page. If unavailable ask to be transferred the Adult Hospitalist Department. DISCHARGE DIAGNOSES   Intraorbital hemorrhage while on warfarin with vision loss  Right subconjunctival hakan ramos    CONSULTATIONS: None    PROCEDURES/SURGERIES: * No surgery found *    PENDING TEST RESULTS:   At the time of discharge the following test results are still pending: none    FOLLOW UP APPOINTMENTS:   Follow-up Information     Follow up With Details Comments Contact Info    Zahra Guthrie MD   [address] [address]      Fred Tarango MD On 6/9/2017 Call his office . Phone [phone]  [phone]             ADDITIONAL CARE RECOMMENDATIONS:   I have called and discussed with Dr Keke Burnett today prior to discharge. he would like to see him in the office tomorrow. Do not take any blood thinning medications such as warfarin,do not take aspirin and other NSAIDS such as Ibuprofen,Motrin. Aleve etc.    Doppler US of the right leg without evidence of blood clot. You could not take blood thinners. Follow with your primary,they may do serial dopplers and if positive for blood clot,IVC filter(umbrella) should be considered. DIET: Cardiac Diet and Diabetic Diet    ACTIVITY: Activity as tolerated    WOUND CARE: NA    EQUIPMENT needed: NA      DISCHARGE MEDICATIONS:   See Medication Reconciliation Form    · It is important that you take the medication exactly as they are prescribed.    · Keep your medication in the bottles provided by the pharmacist and keep a list of the medication names, dosages, and times to be taken in your wallet. · Do not take other medications without consulting your doctor. NOTIFY YOUR PHYSICIAN FOR ANY OF THE FOLLOWING:   Fever over 101 degrees for 24 hours. Chest pain, shortness of breath, fever, chills, nausea, vomiting, diarrhea, change in mentation, falling, weakness, bleeding. Severe pain or pain not relieved by medications. Or, any other signs or symptoms that you may have questions about. DISPOSITION:  x  Home With:   OT  PT  HH  RN       SNF/Inpatient Rehab/LTAC    Independent/assisted living    Hospice    Other:           Signed:    Roldan Garcia MD  6/8/2017  3:55 PM

## 2017-06-08 NOTE — DISCHARGE SUMMARY
Discharge Summary       PATIENT ID: Barry Myers  MRN: [id number]   YOB: 1950    DATE OF ADMISSION: 6/5/2017  3:54 PM    DATE OF DISCHARGE: 6/8/2017  PRIMARY CARE PROVIDER: Cas Lerner MD     ATTENDING PHYSICIAN: Xochilt Shaffer MD  DISCHARGING PROVIDER: Xochilt Shaffer MD    To contact this individual call [phone] and ask the  to page. If unavailable ask to be transferred the Adult Hospitalist Department. CONSULTATIONS: None    PROCEDURES/SURGERIES: * No surgery found *    ADMITTING DIAGNOSES & HOSPITAL COURSE:   Admission Summary: This 78year old [place] speaking lady was admitted for left intraocular hemorrhage. She was on warfarin for DVT that followed recent knee surgery. Interval history / Subjective:   Saw patient, helped translation. No new problems,still not able to see through the left eye      Assessment & Plan:   Left occular hemorrhage with visual loss while on warfarin  -Per ,she might have had long standing vision problem from glaucoma, cataract.  -Warfarin reversed. INR from 11 to 1.2  -Ophthalmology eval pending,it was noted she was send from ophthalmology office. There is notation by admission or ER that ophthalmologist stated they don't have much to add. If they are unable to see her inpatient,she needs to seem them in office ASAP after discharge.  -On steroids. Continue Cosopt and Alphagan  -CT head negative for intracranial abnormality  -Called and talked with Dr Mingo Holm on the phone. Recommended d/c on medrol pack taper. He would like to see her tomorrow. Acquired coagulopathy due to warfarin  -Warfarin stopped. s/p reversal         Right leg DVT dx April 2017,provoked,after knee surgery  -Warfarin stopped. -Repeat doppler 6/7,negative for DVT. -Serial US inp office and if positive for DVT,IVC filter should be considered. Pul edema, no CHF  -Lasix,supplemental oxygen.  -Improved.      Dm on oral hypoglycemics  -POC accuchecks and Humalog SS coverage     HTN, continued on home regimen     Hyponatremia, improved  Hypokalemia: replace  Chronic anemia: for out patient work up. opart of Hb drop would be dilutional.I don't think she is  Safe for any anticoagulation. Obese  Body mass index is 28.09 kg/(m^2). Recent right knee replacement. Knee stiff from immobility  -Encouraged to get up sit in chair and move as much with help from family or staff  -Physical therapy eval.She had HHPT after her knee surgery                           DISCHARGE DIAGNOSES / PLAN:      As above. PENDING TEST RESULTS:   At the time of discharge the following test results are still pending: none    FOLLOW UP APPOINTMENTS:    Follow-up Information     Follow up With Details Comments Contact Info    Sofia Cameron MD   [address]  [place] [address]      Kat Wills MD On 6/9/2017 Call his office . Phone [phone]  [phone]             ADDITIONAL CARE RECOMMENDATIONS:   I have called and discussed with Dr Shalom Gomez today prior to discharge. he would like to see him in the office tomorrow. Do not take any blood thinning medications such as warfarin,do not take aspirin and other NSAIDS such as Ibuprofen,Motrin. Aleve etc.    Doppler US of the right leg without evidence of blood clot. You could not take blood thinners. Follow with your primary,they may do serial dopplers and if positive for blood clot,IVC filter(umbrella) should be considered. DIET: Cardiac Diet and Diabetic Diet    ACTIVITY: Activity as tolerated    WOUND CARE: NA    EQUIPMENT needed: NA      DISCHARGE MEDICATIONS:  Current Discharge Medication List      START taking these medications    Details   brimonidine (ALPHAGAN) 0.2 % ophthalmic solution Administer 1 Drop to left eye every eight (8) hours.   Qty: 15 mL, Refills: 0      dorzolamide-timolol (COSOPT) 22.3-6.8 mg/mL ophthalmic solution Administer 1 Drop to left eye three (3) times daily. Qty: 10 mL, Refills: 0      methylPREDNISolone (MEDROL) 4 mg tab Medrol 4 mg tablet. Day 1:1 tablet three times daily for 3 doses day 1                                 Day 2:1 tablet 2 times daily for 2 doses,                                 Day 3:1 tablet  Once and stop  Qty: 6 Tab, Refills: 0         CONTINUE these medications which have NOT CHANGED    Details   lovastatin (MEVACOR) 40 mg tablet Take 40 mg by mouth daily. sitaGLIPtin-metFORMIN (JANUMET)  mg per tablet Take 1 Tab by mouth two (2) times daily (with meals). lisinopril-hydrochlorothiazide (PRINZIDE, ZESTORETIC) 20-25 mg per tablet Take 1 Tab by mouth daily. traMADol (ULTRAM) 50 mg tablet Take 1 Tab by mouth every six (6) hours as needed for Pain. Max Daily Amount: 200 mg. Qty: 60 Tab, Refills: 0         STOP taking these medications       warfarin (COUMADIN) 5 mg tablet Comments:   Reason for Stopping:         warfarin (COUMADIN) 5 mg tablet Comments:   Reason for Stopping:                 NOTIFY YOUR PHYSICIAN FOR ANY OF THE FOLLOWING:   Fever over 101 degrees for 24 hours. Chest pain, shortness of breath, fever, chills, nausea, vomiting, diarrhea, change in mentation, falling, weakness, bleeding. Severe pain or pain not relieved by medications. Or, any other signs or symptoms that you may have questions about.     DISPOSITION:   x Home With:   OT  PT  HH  RN       Long term SNF/Inpatient Rehab    Independent/assisted living    Hospice    Other:       PATIENT CONDITION AT DISCHARGE:     Functional status    Poor     Deconditioned    x Independent      Cognition   x  Lucid     Forgetful     Dementia      Catheters/lines (plus indication)    Ray     PICC     PEG    x None      Code status   x  Full code     DNR      PHYSICAL EXAMINATION AT DISCHARGE:     Visit Vitals    /75 (BP 1 Location: Left arm, BP Patient Position: At rest)    Pulse 87    Temp 98 °F (36.7 °C)  Resp 16    Ht 5' 3.39\" (1.61 m)    Wt 72.8 kg (160 lb 7.9 oz)    SpO2 100%    Breastfeeding No    BMI 28.09 kg/m2    O2 Flow Rate (L/min): 1 l/min O2 Device: Room air    Temp (24hrs), Av.5 °F (36.9 °C), Min:98 °F (36.7 °C), Max:98.8 °F (37.1 °C)    701 - 1900  In: 240 [P.O.:240]  Out: -    1901 -  07  In: 900 [I.V.:900]  Out: 1400 [Urine:1400]        Constitutional:  No acute distress, cooperative   ENT:  periorbital ecchymosis and injected conjunctiva on the left eye and subconjunctival hemorrhage on the right eye. Resp:  Scattered crackles. CV:  Regular rhythm, normal rate, no murmurs, gallops, rubs    GI:  Soft, non distended, non tender. normoactive bowel sounds, no hepatosplenomegaly     Musculoskeletal:  No edema. Right knee surgical scar. Limited ROM,stiff. Neurologic:  Moves all extremities. Left eye no light perception. AAOx3, CN II-XII reviewed           CHRONIC MEDICAL DIAGNOSES:  Problem List as of 2017  Date Reviewed: 2016          Codes Class Noted - Resolved    Osteoarthritis of right knee ICD-10-CM: M17.11  ICD-9-CM: 715.96  4/3/2017 - Present        Diabetes mellitus type 2, controlled ([facility] (Chronic) ICD-10-CM: E11.9  ICD-9-CM: 250.00  2016 - Present        Syncope ICD-10-CM: R55  ICD-9-CM: 780.2  2016 - Present        CVA, old, hemiparesis ([facility] (Chronic) ICD-10-CM: M29.534  ICD-9-CM: 438.20  2016 - Present        RESOLVED: Coagulopathy ([facility] ICD-10-CM: D68.9  ICD-9-CM: 286.9  2017 - 2017        * (Principal)RESOLVED: Ocular hemorrhage ICD-10-CM: W26.584  ICD-9-CM: 360.43  2017 - 2017              Greater than 30 minutes were spent with the patient on counseling and coordination of care    Signed:    Theodora Kirk MD  2017  4:11 PM

## 2017-06-08 NOTE — PROGRESS NOTES
The patient's  would like to provide Dr. Avi Mcmillan his contact information: his name is Niranjan Cheng and he can be reached at [phone] or [phone].

## 2017-06-08 NOTE — PROGRESS NOTES
I have reviewed discharge instructions with the patient, spouse and daughter. The daughter verbalized understanding. Also gave the daughter the 3 prescriptions and emphasized they need to phone for an eye appointment tomorrow.

## 2017-06-08 NOTE — PROGRESS NOTES
physical Therapy EVALUATION/DISCHARGE  Patient: Mecca Galdamez (69 y.o. female)  Date: 6/8/2017  Primary Diagnosis: Ocular hemorrhage, left  Coagulopathy (HCC)        Precautions:      ASSESSMENT :  Based on the objective data described below, the patient presents with general decreased strength and endurance following orbital hemorrhage and abnormal INR values. Patient is s/p R TKR 2 months and R knee appeared warm and swollen. Patient denied any pain in R knee. Patient speaks limited Zechariah Capuchin but  was present in room and was able to act as . Patient and  have positive outlook on therapy stating that they have continued to do home exercise program for total knee procedure. Reviewed stair safety with  and understanding was demonstrated. Skilled PT is not needed at this time as patient appears near baseline mobility. Recommended that  walk with patient throughout day as able. Skilled physical therapy is not indicated at this time. PLAN :  Discharge Recommendations: home with   Further Equipment Recommendations for Discharge: none     SUBJECTIVE:   Patient stated I want to walk.      OBJECTIVE DATA SUMMARY:   HISTORY:    Past Medical History:   Diagnosis Date    Arthritis     Diabetes ([facility]     Hypertension    History reviewed. No pertinent surgical history.   Prior Level of Function/Home Situation: Independent  Personal factors and/or comorbidities impacting plan of care:     Home Situation  Home Environment: Other (comment) ([facility])  # Steps to Enter: 0  One/Two Story Residence: Two story  # of Interior Steps: 12  Height of Each Step (in): 6 inches  Interior Rails: Left  Lift Chair Available: No  Living Alone: No  Support Systems: Child(kylah), Spouse/Significant Other/Partner  Patient Expects to be Discharged to[de-identified] Private residence  Current DME Used/Available at Home: gary Cheng    EXAMINATION/PRESENTATION/DECISION MAKING:   Critical Behavior:  Neurologic State: Alert  Orientation Level: Oriented X4  Cognition: Follows commands with  translating     Hearing: Auditory  Auditory Impairment: None  Skin:  R knee warm  Edema: R knee swollen  Range Of Motion:  AROM: Generally decreased, functional           PROM: Generally decreased, functional           Strength:    Strength: Generally decreased, functional                    Tone & Sensation:       Patient denied any numbness or tingling                           Vision:     stated that patient has vision deficiency  Functional Mobility:  Bed Mobility:     Supine to Sit: Modified independent     Scooting: Modified independent  Transfers:  Sit to Stand: Stand-by asssistance  Stand to Sit: Stand-by asssistance                       Balance:   Sitting: Intact  Standing: Intact  Ambulation/Gait Training:  Distance (ft): 170 Feet (ft)  Assistive Device: Walker, rolling;Gait belt  Ambulation - Level of Assistance: Contact guard assistance     Gait Description (WDL): Exceptions to WDL  Gait Abnormalities: Antalgic (On Right)  Right Side Weight Bearing: Full  Left Side Weight Bearing: Full  Base of Support: Widened  Stance: Right decreased  Speed/Kyleigh: Pace decreased (<100 feet/min)  Step Length: Right shortened                      Patient gait improved as walking progressed with R antalgic with no defined heel strike     Therapeutic Exercises:   Patient encouraged to continue home exercise program for R knee        Activity Tolerance:   Patient stated fatigue following gait  Please refer to the flowsheet for vital signs taken during this treatment.   After treatment:   [x]   Patient left in no apparent distress sitting up in chair  []   Patient left in no apparent distress in bed  [x]   Call bell left within reach  [x]   Nursing notified  []   Caregiver present  []   Bed alarm activated    COMMUNICATION/EDUCATION:   Communication/Collaboration:  [x]   Fall prevention education was provided and the patient/caregiver indicated understanding. []   Patient/family have participated as able and agree with findings and recommendations. []   Patient is unable to participate in plan of care at this time.   Findings and recommendations were discussed with: Registered Nurse    Thank you for this referral.  Danika Ott   Time Calculation: 35 mins

## 2017-06-08 NOTE — PROGRESS NOTES
Bedside and Verbal shift change report given to Polina Lindsay RN (oncoming nurse) by Farnaz French RN (offgoing nurse). Report included the following information SBAR, Kardex, Intake/Output, MAR and Recent Results.

## 2017-06-08 NOTE — PROGRESS NOTES
Bedside shift change report given to Melia Paul RN (oncoming nurse) by Joaquin Miranda RN (offgoing nurse). Report included the following information SBAR and Kardex.

## 2017-06-08 NOTE — PROGRESS NOTES
Occupational Therapy EVALUATION/discharge  Patient: Mecca Porter (69 y.o. female)  Date: 6/8/2017  Primary Diagnosis: Ocular hemorrhage, left  Coagulopathy (HCC)        Precautions: No vision in L eye, currently reporting no vision in R       ASSESSMENT:   Based on the objective data described below, the patient presents with overall baseline independence for functional mobility and ADLs. Patient with significant visual deficits at baseline, and receives 's assist to compensate. Patient with no vision in L eye currently (unable to fully open lid d/t swelling) and reports she typically has upper field cut to R but today was unable to see anything in lower or lateral visual fields. Reporting she could not see anything. PT reporting she was overall able to maneuver environment during evaluation. . Patient and  reporting her vision has been like this and that it fluctuates. Unsure of cultural or translation barriers as limitations vs pain with opening eyes limiting her ability to participate in visual assessment. Patient will significantly benefit from outpatient low vision services when medically stable and cleared by opthamology. Further skilled acute occupational therapy is not indicated at this time. Discharge Recommendations: Outpatient low vision   Further Equipment Recommendations for Discharge: None noted      SUBJECTIVE:   Patient  present for translation - \"I can't see anything\". OBJECTIVE DATA SUMMARY:   HISTORY:   Past Medical History:   Diagnosis Date    Arthritis     Diabetes ([address]     Hypertension    History reviewed. No pertinent surgical history. Prior Level of Function/Home Situation: Per patient's , patient with vision issues at baseline. L eye was her good eye until this hemorrhage. History of R retinal detachment and reporting R upper field cut loss.  reports her vision fluctuates. Live in [place], [place]; visiting daughters in [facility].  Patient was a  and .  is always with patient to compensate for visual deficits. Expanded or extensive additional review of patient history:     Home Situation  Home Environment: Other (comment) ([facility])  # Steps to Enter: 0  One/Two Story Residence: Two story  # of Interior Steps: 12  Height of Each Step (in): 6 inches  Interior Rails: Left  Lift Chair Available: No  Living Alone: No  Support Systems: Child(kylah), Spouse/Significant Other/Partner  Patient Expects to be Discharged to[de-identified] Private residence  Current DME Used/Available at Home: Trevor Bonilla, quad  []  Right hand dominant   []  Left hand dominant    EXAMINATION OF PERFORMANCE DEFICITS:  Cognitive/Behavioral Status:  Neurologic State: Alert  Orientation Level: Oriented X4  Cognition: Follows commands     Perseveration: No perseveration noted  Safety/Judgement: Decreased awareness of environment (D/t visual deficits)    Skin: Appears intact    Edema: None noted in BUEs    Hearing: Auditory  Auditory Impairment: None    Vision/Perceptual:    Tracking: Unable to test secondary due to decreased visual attention    Saccades: Unable to test secondary to decreased visual attention         Visual Fields: Unable to test secondary due to decreased visual attention; Difficulty detecting stimulus in right upper quadrant (Per patient report, difficulty with R upper )       Acuity: Impaired near vision; Impaired far vision         Range of Motion:  AROM: Within functional limits  PROM: Within functional limits                      Strength:  Strength: Within functional limits                Coordination:  Coordination: Within functional limits  Fine Motor Skills-Upper: Left Intact; Right Intact    Gross Motor Skills-Upper: Left Intact; Right Intact    Tone & Sensation:  Tone: Normal  Sensation: Intact                      Balance:  Sitting: Intact  Standing: Intact    Functional Mobility and Transfers for ADLs:  Bed Mobility:  Supine to Sit: Modified independent  Scooting: Modified independent    Transfers:  Sit to Stand: Stand-by asssistance  Stand to Sit: Stand-by asssistance  Toilet Transfer : Stand-by asssistance    ADL Assessment:  Feeding: Supervision    Oral Facial Hygiene/Grooming: Supervision    Bathing: Minimum assistance    Upper Body Dressing: Supervision    Lower Body Dressing: Minimum assistance    Toileting: Supervision                ADL Intervention and task modifications:       Cognitive Retraining  Safety/Judgement: Decreased awareness of environment (D/t visual deficits)    Functional Measure:  Barthel Index:    Bathin  Bladder: 10  Bowels: 10  Groomin  Dressin  Feedin  Mobility: 10  Stairs: 0  Toilet Use: 5  Transfer (Bed to Chair and Back): 15  Total: 60       Barthel and G-code impairment scale:  Percentage of impairment CH  0% CI  1-19% CJ  20-39% CK  40-59% CL  60-79% CM  80-99% CN  100%   Barthel Score 0-100 100 99-80 79-60 59-40 20-39 1-19   0   Barthel Score 0-20 20 17-19 13-16 9-12 5-8 1-4 0      The Barthel ADL Index: Guidelines  1. The index should be used as a record of what a patient does, not as a record of what a patient could do. 2. The main aim is to establish degree of independence from any help, physical or verbal, however minor and for whatever reason. 3. The need for supervision renders the patient not independent. 4. A patient's performance should be established using the best available evidence. Asking the patient, friends/relatives and nurses are the usual sources, but direct observation and common sense are also important. However direct testing is not needed. 5. Usually the patient's performance over the preceding 24-48 hours is important, but occasionally longer periods will be relevant. 6. Middle categories imply that the patient supplies over 50 per cent of the effort. 7. Use of aids to be independent is allowed. Vinita Morataya., Barthel, D.W. (0159).  Functional evaluation: the Barthel Index. [address] [facility] (14)2. FELIBERTO Mix, Remberto Kelly., Nathan Bradley., Silvia Gaines, [address] [place] [address] Litzy (1999). Measuring the change indisability after inpatient rehabilitation; comparison of the responsiveness of the Barthel Index and Functional Sterling Measure. Journal of Neurology, Neurosurgery, and Psychiatry, 66(4), 345-048. Severiano Hutchinson, N.J.A, JARRET Duran, & Uzma Avendano M.A. (2004.) Assessment of post-stroke quality of life in cost-effectiveness studies: The usefulness of the Barthel Index and the EuroQoL-5D. Quality of Life Research, 13, 983-71       G codes: In compliance with CMSs Claims Based Outcome Reporting, the following G-code set was chosen for this patient based on their primary functional limitation being treated: The outcome measure chosen to determine the severity of the functional limitation was the Barthel Index with a score of 60/100 which was correlated with the impairment scale. ? Self Care:     - CURRENT STATUS: CJ - 20%-39% impaired, limited or restricted    - GOAL STATUS: CJ - 20%-39% impaired, limited or restricted    - D/C STATUS: CJ - 20%-39% impaired, limited or restricted     Occupational Therapy Evaluation Charge Determination   History Examination Decision-Making   LOW Complexity : Brief history review  LOW Complexity : 1-3 performance deficits relating to physical, cognitive , or psychosocial skils that result in activity limitations and / or participation restrictions  MEDIUM Complexity : Patient may present with comorbidities that affect occupational performnce.  Miniml to moderate modification of tasks or assistance (eg, physical or verbal ) with assesment(s) is necessary to enable patient to complete evaluation       Based on the above components, the patient evaluation is determined to be of the following complexity level: LOW   Pain:  Pain Scale 1: Numeric (0 - 10)  Pain Intensity 1: 3  Pain Location 1: Eye  Pain Orientation 1: Left  Pain Description 1: Dull  Pain Intervention(s) 1: Medication (see MAR)  Activity Tolerance:   Good. Please refer to the flowsheet for vital signs taken during this treatment. After treatment:   []  Patient left in no apparent distress sitting up in chair  [x]  Patient left in no apparent distress in bed  [x]  Call bell left within reach  [x]  Nursing notified  [x]  Caregiver present  []  Bed alarm activated    COMMUNICATION/EDUCATION:   Communication/Collaboration:  [x]      Home safety education was provided and the patient/caregiver indicated understanding. [x]      Patient/family have participated as able and agree with findings and recommendations. []      Patient is unable to participate in plan of care at this time.   Findings and recommendations were discussed with: Physical Therapist and Registered Nurse    Kan Gallo OT  Time Calculation: 12 mins

## 2017-06-08 NOTE — PROGRESS NOTES
Hospitalist Progress Note  Navi Mendiola MD  Office: [phone]        Date of Service:  2017  NAME:  Ignacio Ni  :  1950  MRN:  [id number]      Admission Summary: This 78year old [place] speaking lady was admitted for left intraocular hemorrhage. She was on warfarin for DVT that followed recent knee surgery. Interval history / Subjective:   Saw patient, helped translation. No new problems,still not able to see through the left eye     Assessment & Plan:   Left occular hemorrhage with visual loss while on warfarin  -Per ,she might have had long standing vision problem from glaucoma, cataract.  -Warfarin reversed. INR from 11 to 1.2  -Ophthalmology eval pending,it was noted she was send from ophthalmology office. There is notation by admission or ER that ophthalmologist stated they don't have much to add. If they are unable to see her inpatient,she needs to seem them in office ASAP after discharge.  -On steroids. Continue Cosopt and Alphagan  -CT head negative for intracranial abnormality  -Called and talked with Dr Celia Harvey on the phone. Recommended d/c on medrol pack taper. He would like to see her tomorrow. Acquired coagulopathy due to warfarin  -Warfarin stopped. s/p reversal       Right leg DVT dx 2017,provoked,after knee surgery  -Warfarin stopped. -Repeat doppler ,negative for DVT. Pul edema, no CHF  -Lasix,supplemental oxygen.  -Improved. Dm on oral hypoglycemics  -POC accuchecks and Humalog SS coverage    HTN, continued on home regimen    Hyponatremia, improved  Hypokalemia: replace  Chronic anemia: for out patient work up. opart of Hb drop would be dilutional.I don't think she is  Safe for any anticoagulation. Obese  Body mass index is 28.09 kg/(m^2). Recent right knee replacement. Knee stiff from immobility  -Encouraged to get up sit in chair and move as much with help from family or staff  -Physical therapy eval.She had HHPT after her knee surgery      Possible d/c home later today          Code status: full  DVT prophylaxis: scd  Care Plan discussed with: Patient/Family and Nurse  Disposition: Home w/Family in 1-2 days        Hospital Problems  Date Reviewed: 6/18/2016          Codes Class Noted POA    Coagulopathy ([address] ICD-10-CM: D68.9  ICD-9-CM: 286.9  6/5/2017 Unknown        * (Principal)Ocular hemorrhage ICD-10-CM: V59.786  ICD-9-CM: 360.43  6/5/2017 Unknown                Review of Systems:   Pertinent items are noted in HPI. Vital Signs:    Last 24hrs VS reviewed since prior progress note. Most recent are:  Visit Vitals    /66 (BP 1 Location: Left arm, BP Patient Position: At rest)    Pulse 82    Temp 98.8 °F (37.1 °C)    Resp 16    Ht 5' 3.39\" (1.61 m)    Wt 72.8 kg (160 lb 7.9 oz)    SpO2 100%    Breastfeeding No    BMI 28.09 kg/m2         Intake/Output Summary (Last 24 hours) at 06/08/17 0859  Last data filed at 06/07/17 1200   Gross per 24 hour   Intake              200 ml   Output              350 ml   Net             -150 ml        Physical Examination:             Constitutional:  No acute distress, cooperative   ENT:  periorbital ecchymosis and injected conjunctiva on the left eye and subconjunctival hemorrhage on the right eye. Resp:  Scattered crackles. CV:  Regular rhythm, normal rate, no murmurs, gallops, rubs    GI:  Soft, non distended, non tender. normoactive bowel sounds, no hepatosplenomegaly     Musculoskeletal:  No edema. Right knee surgical scar. Limited ROM,stiff. Neurologic:  Moves all extremities.   AAOx3, CN II-XII reviewed            Data Review:    Review and/or order of clinical lab test  Review and/or order of tests in the radiology section of CPT  Review and/or order of tests in the medicine section of CPT      Labs:     Recent Labs      06/08/17   0429  06/06/17   0630   WBC  8.1  7.8   HGB  7.3*  7.5*   HCT  22.6*  22.6*   PLT  355 286     Recent Labs      06/07/17   0531  06/06/17   0630  06/05/17   1556   NA  136  134*  130*   K  4.0  3.3*  3.8   CL  98  94*  93*   CO2  31  30  26   BUN  15  14  20   CREA  0.95  0.83  1.10*   GLU  132*  153*  134*   CA  9.0  9.3  9.8     Recent Labs      06/05/17   1556   SGOT  20   ALT  21   AP  74   TBILI  0.9   TP  7.9   ALB  3.6   GLOB  4.3*     Recent Labs      06/06/17   0630  06/05/17   1556   INR  1.2*  >11.0*   PTP  12.0*  >120.0*   APTT   --   >130.0*      No results for input(s): FE, TIBC, PSAT, FERR in the last 72 hours. No results found for: FOL, RBCF   No results for input(s): PH, PCO2, PO2 in the last 72 hours. No results for input(s): CPK, CKNDX, TROIQ in the last 72 hours.     No lab exists for component: CPKMB  Lab Results   Component Value Date/Time    Cholesterol, total 89 06/19/2016 05:18 AM    HDL Cholesterol 40 06/19/2016 05:18 AM    LDL, calculated 39 06/19/2016 05:18 AM    Triglyceride 50 06/19/2016 05:18 AM    CHOL/HDL Ratio 2.2 06/19/2016 05:18 AM     Lab Results   Component Value Date/Time    Glucose (POC) 153 06/08/2017 06:58 AM    Glucose (POC) 255 06/07/2017 09:31 PM    Glucose (POC) 216 06/07/2017 04:18 PM    Glucose (POC) 184 06/07/2017 12:04 PM    Glucose (POC) 148 06/07/2017 06:40 AM     Lab Results   Component Value Date/Time    Color YELLOW/STRAW 03/29/2017 02:03 PM    Appearance CLEAR 03/29/2017 02:03 PM    Specific gravity 1.016 03/29/2017 02:03 PM    pH (UA) 6.0 03/29/2017 02:03 PM    Protein NEGATIVE  03/29/2017 02:03 PM    Glucose NEGATIVE  03/29/2017 02:03 PM    Ketone NEGATIVE  03/29/2017 02:03 PM    Bilirubin NEGATIVE  03/29/2017 02:03 PM    Urobilinogen 0.2 03/29/2017 02:03 PM    Nitrites NEGATIVE  03/29/2017 02:03 PM    Leukocyte Esterase NEGATIVE  03/29/2017 02:03 PM    Epithelial cells FEW 03/29/2017 02:03 PM    Bacteria NEGATIVE  03/29/2017 02:03 PM    WBC 0-4 03/29/2017 02:03 PM    RBC 0-5 03/29/2017 02:03 PM         Medications Reviewed:     Current Facility-Administered Medications   Medication Dose Route Frequency    acetaminophen (TYLENOL) tablet 650 mg  650 mg Oral Q6H PRN    SALINE PERIPHERAL FLUSH Q8H soln 5 mL  5 mL InterCATHeter Q8H    glucose chewable tablet 16 g  4 Tab Oral PRN    glucagon (GLUCAGEN) injection 1 mg  1 mg IntraMUSCular PRN    insulin lispro (HUMALOG) injection   SubCUTAneous AC&HS    SITagliptin/metFORMIN (JANUMET) 50/500 MG   Oral BID WITH MEALS    dextrose 10 % infusion 125-250 mL  125-250 mL IntraVENous PRN    dorzolamide-timolol (COSOPT) 22.3-6.8 mg/mL ophthalmic solution 1 Drop  1 Drop Left Eye TID    brimonidine (ALPHAGAN) 0.2 % ophthalmic solution 1 Drop  1 Drop Left Eye Q8H    furosemide (LASIX) injection 20 mg  20 mg IntraVENous TID    potassium chloride SR (KLOR-CON 10) tablet 40 mEq  40 mEq Oral DAILY    0.9% sodium chloride infusion 250 mL  250 mL IntraVENous PRN    lovastatin (MEVACOR) tablet 40 mg  40 mg Oral QHS    0.9% sodium chloride infusion 250 mL  250 mL IntraVENous PRN    lisinopril/hydroCHLOROthiazide(PRINZIDE/ZESTORETIC) 20/25 mg   Oral DAILY    methylPREDNISolone (MEDROL) tablet 4 mg  4 mg Oral TID WITH MEALS    Followed by   Laurent Ruelas ON 6/9/2017] methylPREDNISolone (MEDROL) tablet 4 mg  4 mg Oral ACB/HS    Followed by   Laurent Ruelas ON 6/10/2017] methylPREDNISolone (MEDROL) tablet 4 mg  4 mg Oral ACB     ______________________________________________________________________  EXPECTED LENGTH OF STAY: 2d 14h  ACTUAL LENGTH OF STAY:          3                 Shelia Martin MD

## 2017-06-09 NOTE — PROGRESS NOTES
Spoke with daughter regarding unsigned prescriptions. She will call day charge nurse to get this resolved.

## 2017-06-14 ENCOUNTER — HOSPITAL ENCOUNTER (OUTPATIENT)
Dept: LAB | Age: 67
Discharge: HOME | End: 2017-06-14

## 2017-06-14 LAB
ALBUMIN SERPL BCP-MCNC: 4.2 G/DL (ref 3.5–5)
ALBUMIN/GLOB SERPL: 1.1 {RATIO} (ref 1.1–2.2)
ALP SERPL-CCNC: 89 U/L (ref 45–117)
ALT SERPL-CCNC: 30 U/L (ref 12–78)
ANION GAP BLD CALC-SCNC: 10 MMOL/L (ref 5–15)
AST SERPL W P-5'-P-CCNC: 22 U/L (ref 15–37)
BASOPHILS # BLD AUTO: 0 K/UL (ref 0–0.1)
BASOPHILS # BLD: 0 % (ref 0–1)
BILIRUB SERPL-MCNC: 1.1 MG/DL (ref 0.2–1)
BUN SERPL-MCNC: 29 MG/DL (ref 6–20)
BUN/CREAT SERPL: 18 (ref 12–20)
CALCIUM SERPL-MCNC: 9.7 MG/DL (ref 8.5–10.1)
CHLORIDE SERPL-SCNC: 92 MMOL/L (ref 97–108)
CO2 SERPL-SCNC: 27 MMOL/L (ref 21–32)
CREAT SERPL-MCNC: 1.57 MG/DL (ref 0.55–1.02)
EOSINOPHIL # BLD: 0.1 K/UL (ref 0–0.4)
EOSINOPHIL NFR BLD: 1 % (ref 0–7)
ERYTHROCYTE [DISTWIDTH] IN BLOOD BY AUTOMATED COUNT: 15.6 % (ref 11.5–14.5)
GLOBULIN SER CALC-MCNC: 3.7 G/DL (ref 2–4)
GLUCOSE SERPL-MCNC: 111 MG/DL (ref 65–100)
HCT VFR BLD AUTO: 29.3 % (ref 35–47)
HGB BLD-MCNC: 9 G/DL (ref 11.5–16)
LYMPHOCYTES # BLD AUTO: 37 % (ref 12–49)
LYMPHOCYTES # BLD: 2.7 K/UL (ref 0.8–3.5)
MCH RBC QN AUTO: 26.4 PG (ref 26–34)
MCHC RBC AUTO-ENTMCNC: 30.7 G/DL (ref 30–36.5)
MCV RBC AUTO: 85.9 FL (ref 80–99)
MONOCYTES # BLD: 0.6 K/UL (ref 0–1)
MONOCYTES NFR BLD AUTO: 8 % (ref 5–13)
NEUTS SEG # BLD: 4 K/UL (ref 1.8–8)
NEUTS SEG NFR BLD AUTO: 54 % (ref 32–75)
PLATELET # BLD AUTO: 598 K/UL (ref 150–400)
POTASSIUM SERPL-SCNC: 4.5 MMOL/L (ref 3.5–5.1)
PROT SERPL-MCNC: 7.9 G/DL (ref 6.4–8.2)
RBC # BLD AUTO: 3.41 M/UL (ref 3.8–5.2)
SODIUM SERPL-SCNC: 129 MMOL/L (ref 136–145)
WBC # BLD AUTO: 7.3 K/UL (ref 3.6–11)

## 2017-06-14 PROCEDURE — 80053 COMPREHEN METABOLIC PANEL: CPT | Performed by: INTERNAL MEDICINE

## 2017-06-14 PROCEDURE — 85025 COMPLETE CBC W/AUTO DIFF WBC: CPT | Performed by: INTERNAL MEDICINE

## 2017-06-19 ENCOUNTER — HOSPITAL ENCOUNTER (OUTPATIENT)
Dept: LAB | Age: 67
Discharge: HOME | End: 2017-06-19

## 2017-06-19 LAB
ALBUMIN SERPL BCP-MCNC: 4.1 G/DL (ref 3.5–5)
ALBUMIN/GLOB SERPL: 1.1 {RATIO} (ref 1.1–2.2)
ALP SERPL-CCNC: 81 U/L (ref 45–117)
ALT SERPL-CCNC: 24 U/L (ref 12–78)
ANION GAP BLD CALC-SCNC: 14 MMOL/L (ref 5–15)
AST SERPL W P-5'-P-CCNC: 16 U/L (ref 15–37)
BASOPHILS # BLD AUTO: 0 K/UL (ref 0–0.1)
BASOPHILS # BLD: 0 % (ref 0–1)
BILIRUB SERPL-MCNC: 0.4 MG/DL (ref 0.2–1)
BUN SERPL-MCNC: 16 MG/DL (ref 6–20)
BUN/CREAT SERPL: 14 (ref 12–20)
CALCIUM SERPL-MCNC: 9.8 MG/DL (ref 8.5–10.1)
CHLORIDE SERPL-SCNC: 95 MMOL/L (ref 97–108)
CO2 SERPL-SCNC: 20 MMOL/L (ref 21–32)
CREAT SERPL-MCNC: 1.18 MG/DL (ref 0.55–1.02)
EOSINOPHIL # BLD: 0.1 K/UL (ref 0–0.4)
EOSINOPHIL NFR BLD: 1 % (ref 0–7)
ERYTHROCYTE [DISTWIDTH] IN BLOOD BY AUTOMATED COUNT: 16.2 % (ref 11.5–14.5)
GLOBULIN SER CALC-MCNC: 3.7 G/DL (ref 2–4)
GLUCOSE SERPL-MCNC: 163 MG/DL (ref 65–100)
HCT VFR BLD AUTO: 29.5 % (ref 35–47)
HGB BLD-MCNC: 9.1 G/DL (ref 11.5–16)
LYMPHOCYTES # BLD AUTO: 30 % (ref 12–49)
LYMPHOCYTES # BLD: 2.3 K/UL (ref 0.8–3.5)
MCH RBC QN AUTO: 26.1 PG (ref 26–34)
MCHC RBC AUTO-ENTMCNC: 30.8 G/DL (ref 30–36.5)
MCV RBC AUTO: 84.5 FL (ref 80–99)
MONOCYTES # BLD: 0.5 K/UL (ref 0–1)
MONOCYTES NFR BLD AUTO: 6 % (ref 5–13)
NEUTS SEG # BLD: 4.8 K/UL (ref 1.8–8)
NEUTS SEG NFR BLD AUTO: 63 % (ref 32–75)
PLATELET # BLD AUTO: 602 K/UL (ref 150–400)
POTASSIUM SERPL-SCNC: 4.6 MMOL/L (ref 3.5–5.1)
PROT SERPL-MCNC: 7.8 G/DL (ref 6.4–8.2)
RBC # BLD AUTO: 3.49 M/UL (ref 3.8–5.2)
SODIUM SERPL-SCNC: 129 MMOL/L (ref 136–145)
WBC # BLD AUTO: 7.7 K/UL (ref 3.6–11)

## 2017-06-19 PROCEDURE — 85025 COMPLETE CBC W/AUTO DIFF WBC: CPT | Performed by: INTERNAL MEDICINE

## 2017-06-19 PROCEDURE — 80053 COMPREHEN METABOLIC PANEL: CPT | Performed by: INTERNAL MEDICINE

## 2017-07-17 ENCOUNTER — HOSPITAL ENCOUNTER (OUTPATIENT)
Dept: LAB | Age: 67
Discharge: HOME | End: 2017-07-17

## 2017-07-17 ENCOUNTER — HOSPITAL ENCOUNTER (OUTPATIENT)
Dept: VASCULAR SURGERY | Age: 67
Discharge: HOME | End: 2017-07-17
Attending: INTERNAL MEDICINE
Payer: SUBSIDIZED

## 2017-07-17 DIAGNOSIS — I82.401 DEEP VEIN THROMBOSIS OF RIGHT LOWER LIMB (HCC): ICD-10-CM

## 2017-07-17 LAB
ANION GAP BLD CALC-SCNC: 14 MMOL/L (ref 5–15)
BASOPHILS # BLD AUTO: 0 K/UL (ref 0–0.1)
BASOPHILS # BLD: 0 % (ref 0–1)
BUN SERPL-MCNC: 14 MG/DL (ref 6–20)
BUN/CREAT SERPL: 14 (ref 12–20)
CALCIUM SERPL-MCNC: 10.1 MG/DL (ref 8.5–10.1)
CHLORIDE SERPL-SCNC: 100 MMOL/L (ref 97–108)
CO2 SERPL-SCNC: 24 MMOL/L (ref 21–32)
CREAT SERPL-MCNC: 1 MG/DL (ref 0.55–1.02)
EOSINOPHIL # BLD: 0.1 K/UL (ref 0–0.4)
EOSINOPHIL NFR BLD: 1 % (ref 0–7)
ERYTHROCYTE [DISTWIDTH] IN BLOOD BY AUTOMATED COUNT: 18.6 % (ref 11.5–14.5)
GLUCOSE SERPL-MCNC: 132 MG/DL (ref 65–100)
HCT VFR BLD AUTO: 34.1 % (ref 35–47)
HGB BLD-MCNC: 10.5 G/DL (ref 11.5–16)
LYMPHOCYTES # BLD AUTO: 32 % (ref 12–49)
LYMPHOCYTES # BLD: 2.2 K/UL (ref 0.8–3.5)
MCH RBC QN AUTO: 27.3 PG (ref 26–34)
MCHC RBC AUTO-ENTMCNC: 30.8 G/DL (ref 30–36.5)
MCV RBC AUTO: 88.6 FL (ref 80–99)
MONOCYTES # BLD: 0.5 K/UL (ref 0–1)
MONOCYTES NFR BLD AUTO: 7 % (ref 5–13)
NEUTS SEG # BLD: 4.2 K/UL (ref 1.8–8)
NEUTS SEG NFR BLD AUTO: 60 % (ref 32–75)
PLATELET # BLD AUTO: 510 K/UL (ref 150–400)
POTASSIUM SERPL-SCNC: 4.7 MMOL/L (ref 3.5–5.1)
RBC # BLD AUTO: 3.85 M/UL (ref 3.8–5.2)
SODIUM SERPL-SCNC: 138 MMOL/L (ref 136–145)
WBC # BLD AUTO: 6.9 K/UL (ref 3.6–11)

## 2017-07-17 PROCEDURE — 93971 EXTREMITY STUDY: CPT

## 2017-07-17 PROCEDURE — 80048 BASIC METABOLIC PNL TOTAL CA: CPT | Performed by: INTERNAL MEDICINE

## 2017-07-17 PROCEDURE — 85025 COMPLETE CBC W/AUTO DIFF WBC: CPT | Performed by: INTERNAL MEDICINE

## 2017-07-17 NOTE — PROCEDURES
[facility]  *** FINAL REPORT ***    Name: Tracie Guido  MRN: [id number]    Outpatient  : 23 Mar 1950  HIS Order #: [id number]  [address] [facility] Visit #: [id number]  Date: 2017    TYPE OF TEST: Peripheral Venous Testing    REASON FOR TEST  Pain in limb    Right Leg:-  Deep venous thrombosis:           No  Superficial venous thrombosis:    No  Deep venous insufficiency:        Not examined  Superficial venous insufficiency: Not examined      INTERPRETATION/FINDINGS  PROCEDURE:  Color duplex ultrasound imaging of lower extremity veins. FINDINGS:       Right: The common femoral, deep femoral, femoral, popliteal,  posterior tibial, peroneal, and great saphenous are patent and without   evidence of thrombus; each is is fully compressible and there is no  narrowing of the flow channel on color Doppler imaging. Phasic flow  is observed in the common femoral vein. Left:   The common femoral vein is patent and without evidence of   thrombus. Phasic flow is observed. This extremity was not otherwise   evaluated. IMPRESSION:  No evidence of right lower extremity vein thrombosis. ADDITIONAL COMMENTS    I have personally reviewed the data relevant to the interpretation of  this  study. TECHNOLOGIST: Eric Hernandez.  Odilia Vargas  Signed: 2017 10:31 AM    PHYSICIAN: Tyrone Fuentes MD  Signed: 2017 10:34 AM

## 2018-01-23 ENCOUNTER — HOSPITAL ENCOUNTER (OUTPATIENT)
Dept: MAMMOGRAPHY | Age: 68
Discharge: HOME | End: 2018-01-23
Payer: SUBSIDIZED

## 2018-01-23 DIAGNOSIS — Z12.31 VISIT FOR SCREENING MAMMOGRAM: ICD-10-CM

## 2018-01-23 PROCEDURE — 77067 SCR MAMMO BI INCL CAD: CPT

## 2018-02-15 PROCEDURE — 80048 BASIC METABOLIC PNL TOTAL CA: CPT | Performed by: NURSE PRACTITIONER

## 2018-02-15 PROCEDURE — 85025 COMPLETE CBC W/AUTO DIFF WBC: CPT | Performed by: NURSE PRACTITIONER

## 2018-02-16 ENCOUNTER — HOSPITAL ENCOUNTER (OUTPATIENT)
Dept: LAB | Age: 68
Discharge: HOME | End: 2018-02-16

## 2018-02-16 LAB
ANION GAP SERPL CALC-SCNC: 8 MMOL/L (ref 5–15)
BASOPHILS # BLD: 0 K/UL (ref 0–0.1)
BASOPHILS NFR BLD: 0 % (ref 0–1)
BUN SERPL-MCNC: 12 MG/DL (ref 6–20)
BUN/CREAT SERPL: 12 (ref 12–20)
CALCIUM SERPL-MCNC: 9.2 MG/DL (ref 8.5–10.1)
CHLORIDE SERPL-SCNC: 108 MMOL/L (ref 97–108)
CO2 SERPL-SCNC: 28 MMOL/L (ref 21–32)
CREAT SERPL-MCNC: 0.98 MG/DL (ref 0.55–1.02)
DIFFERENTIAL METHOD BLD: ABNORMAL
EOSINOPHIL # BLD: 0.1 K/UL (ref 0–0.4)
EOSINOPHIL NFR BLD: 2 % (ref 0–7)
ERYTHROCYTE [DISTWIDTH] IN BLOOD BY AUTOMATED COUNT: 13.9 % (ref 11.5–14.5)
GLUCOSE SERPL-MCNC: 128 MG/DL (ref 65–100)
HCT VFR BLD AUTO: 37.7 % (ref 35–47)
HGB BLD-MCNC: 11.3 G/DL (ref 11.5–16)
IMM GRANULOCYTES # BLD: 0 K/UL (ref 0–0.04)
IMM GRANULOCYTES NFR BLD AUTO: 0 % (ref 0–0.5)
LYMPHOCYTES # BLD: 2.2 K/UL (ref 0.8–3.5)
LYMPHOCYTES NFR BLD: 40 % (ref 12–49)
MCH RBC QN AUTO: 29 PG (ref 26–34)
MCHC RBC AUTO-ENTMCNC: 30 G/DL (ref 30–36.5)
MCV RBC AUTO: 96.9 FL (ref 80–99)
MONOCYTES # BLD: 0.4 K/UL (ref 0–1)
MONOCYTES NFR BLD: 8 % (ref 5–13)
NEUTS SEG # BLD: 2.7 K/UL (ref 1.8–8)
NEUTS SEG NFR BLD: 49 % (ref 32–75)
NRBC # BLD: 0 K/UL (ref 0–0.01)
NRBC BLD-RTO: 0 PER 100 WBC
PLATELET # BLD AUTO: 227 K/UL (ref 150–400)
PMV BLD AUTO: 10.1 FL (ref 8.9–12.9)
POTASSIUM SERPL-SCNC: 4 MMOL/L (ref 3.5–5.1)
RBC # BLD AUTO: 3.89 M/UL (ref 3.8–5.2)
SODIUM SERPL-SCNC: 144 MMOL/L (ref 136–145)
WBC # BLD AUTO: 5.5 K/UL (ref 3.6–11)

## 2019-04-01 ENCOUNTER — HOSPITAL ENCOUNTER (OUTPATIENT)
Dept: LAB | Age: 69
Discharge: HOME | End: 2019-04-01

## 2019-04-01 PROCEDURE — 87086 URINE CULTURE/COLONY COUNT: CPT

## 2019-04-03 LAB
BACTERIA SPEC CULT: NORMAL
CC UR VC: NORMAL
SERVICE CMNT-IMP: NORMAL

## 2024-07-26 NOTE — PROGRESS NOTES
Bedside shift change report given to Jame Rubio RN (oncoming nurse) by MARIN Travis RN (offgoing nurse). Report included the following information SBAR. Abdominal pain

## (undated) DEVICE — SUTURE VCRL SZ 0 L36IN ABSRB VLT L40MM CT 1/2 CIR J358H

## (undated) DEVICE — DRSG AQUACEL SURG 3.5 X 10IN -- CONVERT TO ITEM 370183

## (undated) DEVICE — NEEDLE HYPO 21GA L1.5IN INTRAMUSCULAR S STL LATCH BVL UP

## (undated) DEVICE — SUTURE MCRYL SZ 2-0 L36IN ABSRB UD L36MM CT-1 1/2 CIR Y945H

## (undated) DEVICE — REM POLYHESIVE ADULT PATIENT RETURN ELECTRODE: Brand: VALLEYLAB

## (undated) DEVICE — PADDING CST 6IN STERILE --

## (undated) DEVICE — DRAPE,EXTREMITY,89X128,STERILE: Brand: MEDLINE

## (undated) DEVICE — HANDPIECE SET WITH BONE CLEANING TIP AND SUCTION TUBE: Brand: INTERPULSE

## (undated) DEVICE — GOWN,SIRUS,NONRNF,SETINSLV,2XL,18/CS: Brand: MEDLINE

## (undated) DEVICE — ZIMMER® STERILE DISPOSABLE TOURNIQUET CUFF WITH PLC, DUAL PORT, SINGLE BLADDER, 34 IN. (86 CM)

## (undated) DEVICE — HOOK LOCK LATEX FREE ELASTIC BANDAGE D/L 6INX10YD

## (undated) DEVICE — DEVICE TRNSF SPIK STL 2008S] MICROTEK MEDICAL INC]

## (undated) DEVICE — 4-PORT MANIFOLD: Brand: NEPTUNE 2

## (undated) DEVICE — TRAY CATH 16F URIN MTR LTX -- CONVERT TO ITEM 363111

## (undated) DEVICE — 3M™ IOBAN™ 2 ANTIMICROBIAL INCISE DRAPE 6651EZ: Brand: IOBAN™ 2

## (undated) DEVICE — CARTRIDGE BNE CEM MIX UNIV TWR VAC ROTOR BRK OFF NOZ W/O

## (undated) DEVICE — SYRINGE MED 20ML STD CLR PLAS LUERLOCK TIP N CTRL DISP

## (undated) DEVICE — BLADE SAW W083XL354IN THK0047IN CUT THK0047IN SAG FLR

## (undated) DEVICE — PADDING CAST SPEC 6INX4YD COT --

## (undated) DEVICE — DEVON™ KNEE AND BODY STRAP 60" X 3" (1.5 M X 7.6 CM): Brand: DEVON

## (undated) DEVICE — PREP SKN PREVAIL 40ML APPL --

## (undated) DEVICE — Z DISCONTINUED USE 2744636  DRESSING AQUACEL 14 IN ALG W3.5XL14IN POLYUR FLM CVR W/ HYDRCOLL

## (undated) DEVICE — SUTURE MCRYL SZ 3-0 L27IN ABSRB UD L24MM PS-1 3/8 CIR PRIM Y936H

## (undated) DEVICE — T4 HOOD

## (undated) DEVICE — SUTURE VCRL SZ 2-0 L36IN ABSRB UD L40MM CT 1/2 CIR J957H

## (undated) DEVICE — PREP KIT PEEL PTCH POVIDONE IOD

## (undated) DEVICE — SOLUTION IRRIG 1000ML H2O STRL BLT

## (undated) DEVICE — SCRUB DRY SURG EZ SCRUB BRUSH PREOPERATIVE GRN

## (undated) DEVICE — MIXER BNE CEM VAC BRKOFF NOZ PRISM II

## (undated) DEVICE — NEEDLE HYPO 22GA L1.5IN BLK POLYPR HUB S STL REG BVL STR

## (undated) DEVICE — INFECTION CONTROL KIT SYS

## (undated) DEVICE — SOLUTION IRRIG 3000ML 0.9% SOD CHL FLX CONT 0797208] ICU MEDICAL INC]

## (undated) DEVICE — SUTURE VCRL 1 L27IN ABSRB CT BRAID COAT UD J281H

## (undated) DEVICE — STERILE POLYISOPRENE POWDER-FREE SURGICAL GLOVES: Brand: PROTEXIS

## (undated) DEVICE — SOL IRR SOD CL 0.9% 1000ML BTL --

## (undated) DEVICE — SUTURE STRATAFIX SPRL SZ 1 L5IN ABSRB VLT CT-1 L36MM 1/2 SXPD2B401

## (undated) DEVICE — STERILE POLYISOPRENE POWDER-FREE SURGICAL GLOVES WITH EMOLLIENT COATING: Brand: PROTEXIS

## (undated) DEVICE — NEEDLE HYPO 18GA L1.5IN PNK S STL HUB POLYPR SHLD REG BVL

## (undated) DEVICE — Device

## (undated) DEVICE — 2108 SERIES SAGITTAL BLADE (18.6 X 0.8 X 73.8MM)

## (undated) DEVICE — DERMABOND SKIN ADH 0.7ML -- DERMABOND ADVANCED 12/BX

## (undated) DEVICE — BLADE SAW W051XL276IN THK005IN CUT THK005IN REPL SAG FLR

## (undated) DEVICE — Z CONVERTED USE 2271043 CONTAINER SPEC COLL 4OZ SCR ON LID PEEL PCH